# Patient Record
Sex: FEMALE | Race: WHITE | NOT HISPANIC OR LATINO | Employment: OTHER | URBAN - METROPOLITAN AREA
[De-identification: names, ages, dates, MRNs, and addresses within clinical notes are randomized per-mention and may not be internally consistent; named-entity substitution may affect disease eponyms.]

---

## 2024-10-11 ENCOUNTER — HOSPITAL ENCOUNTER (INPATIENT)
Facility: HOSPITAL | Age: 79
LOS: 5 days | Discharge: HOME | DRG: 640 | End: 2024-10-16
Attending: EMERGENCY MEDICINE | Admitting: INTERNAL MEDICINE
Payer: MEDICARE

## 2024-10-11 ENCOUNTER — APPOINTMENT (INPATIENT)
Dept: CT IMAGING | Facility: HOSPITAL | Age: 79
DRG: 640 | End: 2024-10-11
Payer: MEDICARE

## 2024-10-11 ENCOUNTER — APPOINTMENT (INPATIENT)
Dept: RADIOLOGY | Facility: HOSPITAL | Age: 79
DRG: 640 | End: 2024-10-11
Payer: MEDICARE

## 2024-10-11 ENCOUNTER — APPOINTMENT (EMERGENCY)
Dept: RADIOLOGY | Facility: HOSPITAL | Age: 79
DRG: 640 | End: 2024-10-11
Payer: MEDICARE

## 2024-10-11 ENCOUNTER — APPOINTMENT (EMERGENCY)
Dept: CT IMAGING | Facility: HOSPITAL | Age: 79
DRG: 640 | End: 2024-10-11
Payer: MEDICARE

## 2024-10-11 DIAGNOSIS — R42 DIZZINESS: ICD-10-CM

## 2024-10-11 DIAGNOSIS — R29.90 STROKE-LIKE SYMPTOMS: Primary | ICD-10-CM

## 2024-10-11 DIAGNOSIS — I10 HTN (HYPERTENSION): ICD-10-CM

## 2024-10-11 DIAGNOSIS — Z91.81 H/O FALL: ICD-10-CM

## 2024-10-11 DIAGNOSIS — D64.9 ANEMIA: ICD-10-CM

## 2024-10-11 DIAGNOSIS — E87.1 HYPONATREMIA: ICD-10-CM

## 2024-10-11 DIAGNOSIS — R41.82 ALTERED MENTAL STATUS: ICD-10-CM

## 2024-10-11 PROBLEM — E03.9 HYPOTHYROID: Status: ACTIVE | Noted: 2024-10-11

## 2024-10-11 PROBLEM — E78.5 HLD (HYPERLIPIDEMIA): Status: ACTIVE | Noted: 2024-10-11

## 2024-10-11 PROBLEM — N18.9 CKD (CHRONIC KIDNEY DISEASE): Status: ACTIVE | Noted: 2024-10-11

## 2024-10-11 PROBLEM — R05.9 COUGH: Status: ACTIVE | Noted: 2024-10-11

## 2024-10-11 LAB
AMMONIA PLAS-SCNC: 10 UMOL/L (ref 18–72)
AMPHETAMINES SERPL QL SCN: NEGATIVE
ANION GAP SERPL CALCULATED.3IONS-SCNC: 10 MMOL/L (ref 4–13)
ANION GAP SERPL CALCULATED.3IONS-SCNC: 10 MMOL/L (ref 4–13)
ANION GAP SERPL CALCULATED.3IONS-SCNC: 8 MMOL/L (ref 4–13)
ANION GAP SERPL CALCULATED.3IONS-SCNC: 9 MMOL/L (ref 4–13)
APAP SERPL-MCNC: <2 UG/ML (ref 10–20)
APTT PPP: 20 SECONDS (ref 23–34)
BARBITURATES UR QL: NEGATIVE
BENZODIAZ UR QL: NEGATIVE
BILIRUB UR QL STRIP: NEGATIVE
BNP SERPL-MCNC: 47 PG/ML (ref 0–100)
BUN SERPL-MCNC: 24 MG/DL (ref 5–25)
BUN SERPL-MCNC: 24 MG/DL (ref 5–25)
BUN SERPL-MCNC: 25 MG/DL (ref 5–25)
BUN SERPL-MCNC: 25 MG/DL (ref 5–25)
CALCIUM SERPL-MCNC: 8.9 MG/DL (ref 8.4–10.2)
CALCIUM SERPL-MCNC: 8.9 MG/DL (ref 8.4–10.2)
CALCIUM SERPL-MCNC: 9 MG/DL (ref 8.4–10.2)
CALCIUM SERPL-MCNC: 9.3 MG/DL (ref 8.4–10.2)
CARDIAC TROPONIN I PNL SERPL HS: 3 NG/L
CHLORIDE SERPL-SCNC: 93 MMOL/L (ref 96–108)
CHLORIDE SERPL-SCNC: 96 MMOL/L (ref 96–108)
CHLORIDE SERPL-SCNC: 96 MMOL/L (ref 96–108)
CHLORIDE SERPL-SCNC: 97 MMOL/L (ref 96–108)
CLARITY UR: CLEAR
CO2 SERPL-SCNC: 22 MMOL/L (ref 21–32)
CO2 SERPL-SCNC: 24 MMOL/L (ref 21–32)
CO2 SERPL-SCNC: 24 MMOL/L (ref 21–32)
CO2 SERPL-SCNC: 27 MMOL/L (ref 21–32)
COCAINE UR QL: NEGATIVE
COLOR UR: COLORLESS
CORTIS AM PEAK SERPL-MCNC: 35.8 UG/DL (ref 6.7–22.6)
CREAT SERPL-MCNC: 1.1 MG/DL (ref 0.6–1.3)
CREAT SERPL-MCNC: 1.23 MG/DL (ref 0.6–1.3)
CREAT SERPL-MCNC: 1.39 MG/DL (ref 0.6–1.3)
CREAT SERPL-MCNC: 1.49 MG/DL (ref 0.6–1.3)
ERYTHROCYTE [DISTWIDTH] IN BLOOD BY AUTOMATED COUNT: 12.9 % (ref 11.6–15.1)
ETHANOL SERPL-MCNC: <10 MG/DL
FENTANYL UR QL SCN: NEGATIVE
FLUAV RNA RESP QL NAA+PROBE: NEGATIVE
FLUBV RNA RESP QL NAA+PROBE: NEGATIVE
GFR SERPL CREATININE-BSD FRML MDRD: 32 ML/MIN/1.73SQ M
GFR SERPL CREATININE-BSD FRML MDRD: 35 ML/MIN/1.73SQ M
GFR SERPL CREATININE-BSD FRML MDRD: 41 ML/MIN/1.73SQ M
GFR SERPL CREATININE-BSD FRML MDRD: 47 ML/MIN/1.73SQ M
GLUCOSE SERPL-MCNC: 100 MG/DL (ref 65–140)
GLUCOSE SERPL-MCNC: 103 MG/DL (ref 65–140)
GLUCOSE SERPL-MCNC: 106 MG/DL (ref 65–140)
GLUCOSE SERPL-MCNC: 108 MG/DL (ref 65–140)
GLUCOSE SERPL-MCNC: 110 MG/DL (ref 65–140)
GLUCOSE UR STRIP-MCNC: NEGATIVE MG/DL
HCT VFR BLD AUTO: 30.2 % (ref 34.8–46.1)
HGB BLD-MCNC: 10.4 G/DL (ref 11.5–15.4)
HGB RETIC QN AUTO: 32.5 PG (ref 30–38.3)
HGB UR QL STRIP.AUTO: NEGATIVE
HYDROCODONE UR QL SCN: NEGATIVE
IMM RETICS NFR: 5.7 % (ref 0–14)
INR PPP: 0.93 (ref 0.85–1.19)
KETONES UR STRIP-MCNC: NEGATIVE MG/DL
LDH SERPL-CCNC: 156 U/L (ref 140–271)
LEUKOCYTE ESTERASE UR QL STRIP: NEGATIVE
MCH RBC QN AUTO: 28.2 PG (ref 26.8–34.3)
MCHC RBC AUTO-ENTMCNC: 34.4 G/DL (ref 31.4–37.4)
MCV RBC AUTO: 82 FL (ref 82–98)
METHADONE UR QL: NEGATIVE
NITRITE UR QL STRIP: NEGATIVE
OPIATES UR QL SCN: NEGATIVE
OSMOLALITY UR/SERPL-RTO: 286 MMOL/KG (ref 282–298)
OSMOLALITY UR/SERPL-RTO: 287 MMOL/KG (ref 282–298)
OSMOLALITY UR/SERPL-RTO: 287 MMOL/KG (ref 282–298)
OSMOLALITY UR: 420 MMOL/KG
OXYCODONE+OXYMORPHONE UR QL SCN: NEGATIVE
PCP UR QL: NEGATIVE
PH UR STRIP.AUTO: 8 [PH]
PLATELET # BLD AUTO: 321 THOUSANDS/UL (ref 149–390)
PMV BLD AUTO: 8.5 FL (ref 8.9–12.7)
POTASSIUM SERPL-SCNC: 4.3 MMOL/L (ref 3.5–5.3)
POTASSIUM SERPL-SCNC: 4.4 MMOL/L (ref 3.5–5.3)
POTASSIUM SERPL-SCNC: 4.5 MMOL/L (ref 3.5–5.3)
POTASSIUM SERPL-SCNC: 4.7 MMOL/L (ref 3.5–5.3)
PROCALCITONIN SERPL-MCNC: <0.05 NG/ML
PROT UR STRIP-MCNC: NEGATIVE MG/DL
PROTHROMBIN TIME: 13.1 SECONDS (ref 12.3–15)
RBC # BLD AUTO: 3.69 MILLION/UL (ref 3.81–5.12)
RETICS # AUTO: NORMAL 10*3/UL (ref 14097–95744)
RETICS # CALC: 1.62 % (ref 0.37–1.87)
RSV RNA RESP QL NAA+PROBE: NEGATIVE
SALICYLATES SERPL-MCNC: <5 MG/DL (ref 3–20)
SARS-COV-2 RNA RESP QL NAA+PROBE: NEGATIVE
SODIUM 24H UR-SCNC: 48 MOL/L
SODIUM SERPL-SCNC: 126 MMOL/L (ref 135–147)
SODIUM SERPL-SCNC: 129 MMOL/L (ref 135–147)
SODIUM SERPL-SCNC: 130 MMOL/L (ref 135–147)
SODIUM SERPL-SCNC: 131 MMOL/L (ref 135–147)
SP GR UR STRIP.AUTO: 1.03 (ref 1–1.03)
T4 FREE SERPL-MCNC: 0.94 NG/DL (ref 0.61–1.12)
THC UR QL: NEGATIVE
TSH SERPL DL<=0.05 MIU/L-ACNC: 5.38 UIU/ML (ref 0.45–4.5)
UROBILINOGEN UR STRIP-ACNC: <2 MG/DL
VIT B12 SERPL-MCNC: 209 PG/ML (ref 180–914)
WBC # BLD AUTO: 9.05 THOUSAND/UL (ref 4.31–10.16)

## 2024-10-11 PROCEDURE — 85046 RETICYTE/HGB CONCENTRATE: CPT | Performed by: STUDENT IN AN ORGANIZED HEALTH CARE EDUCATION/TRAINING PROGRAM

## 2024-10-11 PROCEDURE — 85730 THROMBOPLASTIN TIME PARTIAL: CPT

## 2024-10-11 PROCEDURE — 73030 X-RAY EXAM OF SHOULDER: CPT

## 2024-10-11 PROCEDURE — 84484 ASSAY OF TROPONIN QUANT: CPT

## 2024-10-11 PROCEDURE — 85610 PROTHROMBIN TIME: CPT

## 2024-10-11 PROCEDURE — 99285 EMERGENCY DEPT VISIT HI MDM: CPT | Performed by: EMERGENCY MEDICINE

## 2024-10-11 PROCEDURE — 85027 COMPLETE CBC AUTOMATED: CPT

## 2024-10-11 PROCEDURE — 99285 EMERGENCY DEPT VISIT HI MDM: CPT

## 2024-10-11 PROCEDURE — 74176 CT ABD & PELVIS W/O CONTRAST: CPT

## 2024-10-11 PROCEDURE — 71045 X-RAY EXAM CHEST 1 VIEW: CPT

## 2024-10-11 PROCEDURE — 83550 IRON BINDING TEST: CPT | Performed by: STUDENT IN AN ORGANIZED HEALTH CARE EDUCATION/TRAINING PROGRAM

## 2024-10-11 PROCEDURE — 83935 ASSAY OF URINE OSMOLALITY: CPT | Performed by: STUDENT IN AN ORGANIZED HEALTH CARE EDUCATION/TRAINING PROGRAM

## 2024-10-11 PROCEDURE — 96374 THER/PROPH/DIAG INJ IV PUSH: CPT

## 2024-10-11 PROCEDURE — 81003 URINALYSIS AUTO W/O SCOPE: CPT

## 2024-10-11 PROCEDURE — 71250 CT THORAX DX C-: CPT

## 2024-10-11 PROCEDURE — 83540 ASSAY OF IRON: CPT | Performed by: STUDENT IN AN ORGANIZED HEALTH CARE EDUCATION/TRAINING PROGRAM

## 2024-10-11 PROCEDURE — 84439 ASSAY OF FREE THYROXINE: CPT

## 2024-10-11 PROCEDURE — 84145 PROCALCITONIN (PCT): CPT | Performed by: STUDENT IN AN ORGANIZED HEALTH CARE EDUCATION/TRAINING PROGRAM

## 2024-10-11 PROCEDURE — 87040 BLOOD CULTURE FOR BACTERIA: CPT | Performed by: STUDENT IN AN ORGANIZED HEALTH CARE EDUCATION/TRAINING PROGRAM

## 2024-10-11 PROCEDURE — 80048 BASIC METABOLIC PNL TOTAL CA: CPT

## 2024-10-11 PROCEDURE — 83615 LACTATE (LD) (LDH) ENZYME: CPT | Performed by: STUDENT IN AN ORGANIZED HEALTH CARE EDUCATION/TRAINING PROGRAM

## 2024-10-11 PROCEDURE — 80143 DRUG ASSAY ACETAMINOPHEN: CPT

## 2024-10-11 PROCEDURE — 82140 ASSAY OF AMMONIA: CPT

## 2024-10-11 PROCEDURE — 83918 ORGANIC ACIDS TOTAL QUANT: CPT | Performed by: STUDENT IN AN ORGANIZED HEALTH CARE EDUCATION/TRAINING PROGRAM

## 2024-10-11 PROCEDURE — 84300 ASSAY OF URINE SODIUM: CPT | Performed by: STUDENT IN AN ORGANIZED HEALTH CARE EDUCATION/TRAINING PROGRAM

## 2024-10-11 PROCEDURE — 0241U HB NFCT DS VIR RESP RNA 4 TRGT: CPT | Performed by: STUDENT IN AN ORGANIZED HEALTH CARE EDUCATION/TRAINING PROGRAM

## 2024-10-11 PROCEDURE — 80307 DRUG TEST PRSMV CHEM ANLYZR: CPT

## 2024-10-11 PROCEDURE — 80179 DRUG ASSAY SALICYLATE: CPT

## 2024-10-11 PROCEDURE — 82607 VITAMIN B-12: CPT

## 2024-10-11 PROCEDURE — 83880 ASSAY OF NATRIURETIC PEPTIDE: CPT | Performed by: STUDENT IN AN ORGANIZED HEALTH CARE EDUCATION/TRAINING PROGRAM

## 2024-10-11 PROCEDURE — 36415 COLL VENOUS BLD VENIPUNCTURE: CPT

## 2024-10-11 PROCEDURE — 82077 ASSAY SPEC XCP UR&BREATH IA: CPT

## 2024-10-11 PROCEDURE — 83930 ASSAY OF BLOOD OSMOLALITY: CPT | Performed by: STUDENT IN AN ORGANIZED HEALTH CARE EDUCATION/TRAINING PROGRAM

## 2024-10-11 PROCEDURE — 82533 TOTAL CORTISOL: CPT | Performed by: STUDENT IN AN ORGANIZED HEALTH CARE EDUCATION/TRAINING PROGRAM

## 2024-10-11 PROCEDURE — 70498 CT ANGIOGRAPHY NECK: CPT

## 2024-10-11 PROCEDURE — 82728 ASSAY OF FERRITIN: CPT | Performed by: STUDENT IN AN ORGANIZED HEALTH CARE EDUCATION/TRAINING PROGRAM

## 2024-10-11 PROCEDURE — 99291 CRITICAL CARE FIRST HOUR: CPT | Performed by: PSYCHIATRY & NEUROLOGY

## 2024-10-11 PROCEDURE — 84443 ASSAY THYROID STIM HORMONE: CPT

## 2024-10-11 PROCEDURE — 80048 BASIC METABOLIC PNL TOTAL CA: CPT | Performed by: STUDENT IN AN ORGANIZED HEALTH CARE EDUCATION/TRAINING PROGRAM

## 2024-10-11 PROCEDURE — 82948 REAGENT STRIP/BLOOD GLUCOSE: CPT

## 2024-10-11 PROCEDURE — 82746 ASSAY OF FOLIC ACID SERUM: CPT | Performed by: STUDENT IN AN ORGANIZED HEALTH CARE EDUCATION/TRAINING PROGRAM

## 2024-10-11 PROCEDURE — 70496 CT ANGIOGRAPHY HEAD: CPT

## 2024-10-11 PROCEDURE — 99222 1ST HOSP IP/OBS MODERATE 55: CPT | Performed by: INTERNAL MEDICINE

## 2024-10-11 RX ORDER — ONDANSETRON 2 MG/ML
4 INJECTION INTRAMUSCULAR; INTRAVENOUS ONCE
Status: COMPLETED | OUTPATIENT
Start: 2024-10-11 | End: 2024-10-11

## 2024-10-11 RX ORDER — ASPIRIN 81 MG/1
81 TABLET, CHEWABLE ORAL DAILY
Status: DISCONTINUED | OUTPATIENT
Start: 2024-10-12 | End: 2024-10-14

## 2024-10-11 RX ORDER — LEVETIRACETAM 500 MG/5ML
1000 INJECTION, SOLUTION, CONCENTRATE INTRAVENOUS ONCE
Status: CANCELLED | OUTPATIENT
Start: 2024-10-11

## 2024-10-11 RX ORDER — DIPHENHYDRAMINE HYDROCHLORIDE 50 MG/ML
50 INJECTION INTRAMUSCULAR; INTRAVENOUS EVERY 6 HOURS PRN
Status: DISCONTINUED | OUTPATIENT
Start: 2024-10-11 | End: 2024-10-16 | Stop reason: HOSPADM

## 2024-10-11 RX ORDER — ATENOLOL 25 MG/1
25 TABLET ORAL DAILY
Status: DISCONTINUED | OUTPATIENT
Start: 2024-10-12 | End: 2024-10-15

## 2024-10-11 RX ORDER — LORAZEPAM 2 MG/ML
0.5 INJECTION INTRAMUSCULAR ONCE
Status: COMPLETED | OUTPATIENT
Start: 2024-10-11 | End: 2024-10-11

## 2024-10-11 RX ORDER — ENOXAPARIN SODIUM 100 MG/ML
30 INJECTION SUBCUTANEOUS DAILY
Status: DISCONTINUED | OUTPATIENT
Start: 2024-10-12 | End: 2024-10-13

## 2024-10-11 RX ORDER — ENOXAPARIN SODIUM 100 MG/ML
40 INJECTION SUBCUTANEOUS DAILY
Status: DISCONTINUED | OUTPATIENT
Start: 2024-10-11 | End: 2024-10-11

## 2024-10-11 RX ORDER — ACETAMINOPHEN 650 MG/1
650 SUPPOSITORY RECTAL EVERY 6 HOURS PRN
Status: DISCONTINUED | OUTPATIENT
Start: 2024-10-11 | End: 2024-10-16 | Stop reason: HOSPADM

## 2024-10-11 RX ORDER — ASPIRIN 325 MG
325 TABLET ORAL ONCE
Status: DISCONTINUED | OUTPATIENT
Start: 2024-10-11 | End: 2024-10-14

## 2024-10-11 RX ORDER — HYDRALAZINE HYDROCHLORIDE 20 MG/ML
5 INJECTION INTRAMUSCULAR; INTRAVENOUS EVERY 6 HOURS PRN
Status: DISCONTINUED | OUTPATIENT
Start: 2024-10-11 | End: 2024-10-14

## 2024-10-11 RX ORDER — ATENOLOL 25 MG/1
25 TABLET ORAL DAILY
Status: ON HOLD | COMMUNITY
End: 2024-10-16

## 2024-10-11 RX ORDER — CYANOCOBALAMIN 1000 UG/ML
1000 INJECTION, SOLUTION INTRAMUSCULAR; SUBCUTANEOUS DAILY
Status: DISCONTINUED | OUTPATIENT
Start: 2024-10-11 | End: 2024-10-12

## 2024-10-11 RX ORDER — CLOPIDOGREL BISULFATE 75 MG/1
300 TABLET ORAL ONCE
Status: DISCONTINUED | OUTPATIENT
Start: 2024-10-11 | End: 2024-10-14

## 2024-10-11 RX ORDER — CLOPIDOGREL BISULFATE 75 MG/1
75 TABLET ORAL DAILY
Status: DISCONTINUED | OUTPATIENT
Start: 2024-10-12 | End: 2024-10-12

## 2024-10-11 RX ADMIN — SODIUM CHLORIDE 500 ML: 0.9 INJECTION, SOLUTION INTRAVENOUS at 14:16

## 2024-10-11 RX ADMIN — IOHEXOL 85 ML: 350 INJECTION, SOLUTION INTRAVENOUS at 12:26

## 2024-10-11 RX ADMIN — ONDANSETRON 4 MG: 2 INJECTION INTRAMUSCULAR; INTRAVENOUS at 14:13

## 2024-10-11 RX ADMIN — LORAZEPAM 0.5 MG: 2 INJECTION INTRAMUSCULAR; INTRAVENOUS at 15:18

## 2024-10-11 RX ADMIN — CEFTRIAXONE 1000 MG: 2 INJECTION, POWDER, FOR SOLUTION INTRAMUSCULAR; INTRAVENOUS at 20:22

## 2024-10-11 RX ADMIN — CYANOCOBALAMIN 1000 MCG: 1000 INJECTION, SOLUTION INTRAMUSCULAR; SUBCUTANEOUS at 20:22

## 2024-10-11 RX ADMIN — DIPHENHYDRAMINE HYDROCHLORIDE 50 MG: 50 INJECTION, SOLUTION INTRAMUSCULAR; INTRAVENOUS at 18:40

## 2024-10-11 NOTE — ASSESSMENT & PLAN NOTE
Pt's son reports that his mom was coughing yesterday  He is unable to clarify if cough was productive,he denies fever  WBC wnl, CXR is unremarkable  Follow CT CAP  Obtain Covid  Follow proCal  We'll hold off on oral symptomatic meds due to AMS for now

## 2024-10-11 NOTE — ASSESSMENT & PLAN NOTE
Lab Results   Component Value Date    EGFR 47 10/11/2024    CREATININE 1.10 10/11/2024   Avoid hypotension and nephrotoxic medications

## 2024-10-11 NOTE — ASSESSMENT & PLAN NOTE
"Pt's son reported fall about 2.5 weeks ago on her \"knees and hands\", managed with tylenol  Pt was complaining of R shoulder and right lateral chest wall pain (possibly ribs)  Xray chest was unremarkable  Obtain Right shoulder Xray  Tylenol for pain prn  Voltaren gel and lidocaine patch prn    "

## 2024-10-11 NOTE — ASSESSMENT & PLAN NOTE
POA with confusion, delirium starting this am, last well known yesterday as per son  Denies any sx of URI, c/o of dysuria, chest pain, fever, chills or any other complains  Pt's so admits mild cough yesterday with no fever  On evaluation pt is disoriented, delirious, restless and agitated, unable to answer questions or to provide history. Due to this comprehensive neuro exam wasn't completed. Pt seems to move all 4 extremities however there is suspicion for right sided neglect  Pt was hypertensive on exam to  which improved to 160  Blood work was significant for TSH 5.3, normocytic anemia with Hgb 10.4, Na+ 126, high specific gravity in UA 1.031  Normal coma panel, normal ammonia  CTH and CTA h&n showed no acute bleeding or significant occlusion  Pt received  cc bolus in ED, load dose of asa 325 and plavix 300, ativan 0.5 for agitation    Ddx: Stroke vs seizure vs hyponatremia (previous similar presentation in 2021, recent fall with pain in shoulder for the last 2 weeks) vs metabolic etiology (low suspicion for infection, possible undiagnosed malignancy) vs toxic (less likely with normal coma panel)    Plan:  Admit for stroke pathway  Management of hyponatremia as above  Allow permissive hypertension for the next 24 hours  Monitor vitals and fever curve  Follow daily BMP, replete electrolytes as needed  Monitor patient on telemetry for 24 hours  Keep n.p.o. in settings of altered mental status  Speech evaluation when patient is more alert  Follow lipid panel, A1c, vit B12  Continue with aspirin 81 mg daily starting tomorrow if patient is more alert  Continue with Plavix 75 mg daily starting tomorrow if patient is more alert  Follow MRI brain  Follow ECHO  PT/OT eval would be appreciated when pt is more alert  Fall precautions  Follow CT CAP w/o contrast

## 2024-10-11 NOTE — ASSESSMENT & PLAN NOTE
Assessment:  Patient is an 79-year-old female that presented to [facility] as a stroke alert in setting of altered mental status.  Patient has a past medical history of hypertension on atenolol.  Patient's last known well was reported to be 10/11/2024 at 0300 where before going to bed the patient had dinner in which she fell with her son.  The patient was found in a confused state on 10/11/2024 at 0830.  Blood pressure 160/62, blood glucose 160, no AC AP at baseline, NIH 14, noncontrast CT head demonstrated no acute intracranial abnormality, CTA head and neck demonstrated no significant intracranial stenosis/LVO or aneurysm.  Patient not a tenecteplase candidate due to being out of time window, not a thrombectomy candidate due to no LVO target.  Per discussion with patient's son, Manuel, at bedside patient had a similar episode in 2021 with the patient was hospitalized at [facility] and was found to be hyponatremic.  Patient was reported to have had other workup completed which was unremarkable and after a few days patient was discharged home.  Was reported to have had a fall 2 weeks ago with unknown head strike.  Patient was reported to have had a cough with questionable bloody stools.    Initial examination demonstrated the patient in significant acute distress, significant confusion, patient had inability to reorient, inability to follow commands, thus limiting ROS, physical examination, but update as of 10/12 AM with pt's son at bedside - AAOx4 with improved mentation though slightly slowed response but also noting [place], no focal lateralizing deficits. Son reports pt very close to baseline now. Na level also improved from 126 to 131 as of this AM.    TTE, 10/12/24: EF 65%, normal systolic fxn, G1DD, mildly dilated LA, no major valvular dysfunction, IVC normal size.  Telemetry as of 10/12AM - No cardiac arrhythmia.    Lab Results   Component Value Date    CHOLESTEROL 168 10/12/2024     TRIG 119 10/12/2024    HDL 43 (L) 10/12/2024    LDLCALC 101 (H) 10/12/2024     Lab Results   Component Value Date    HGBA1C 5.6 10/12/2024     Lab Results   Component Value Date    [id number] 5.383 (H) 10/11/2024    FREET4 0.94 10/11/2024     Impression: Low suspicion for CVA. Suspect TME in setting of hypoNa and suspected PNA.    Plan:  Continue stroke pathway, pending MRI brain  If MRI brain negative, OK to d/c stroke pathway  Pt's son OK with pt receiving ASA but declined Plavix. Will cont ASA 81mg daily for now and d/c Plavix. OK to d/c ASA if MRI brain negative for stroke.  Pt's son would like to hold off on Atorvastatin at this time - reasonable.  Normotensive goal.  Normoglycemia (glucose <180), normothermia.  Continue to monitor on telemetry.  Frequent neuro checks per protocol  PT/OT/ST eval appreciated  Does occasionally feel like fluid is getting stuck in her throat but no swallowing issues per speech. Will defer to primary team for any additional eval.  If MRI brain negative, neurology will sign off with no indication for outpatient neurology follow up. If MRI brain is concerning, neurology team will be back in touch with final recommendations.  We will follow peripherally. Please reach out with any questions or concerns.

## 2024-10-11 NOTE — ASSESSMENT & PLAN NOTE
POA with HGB 10.4, normocytic, RDW within normal range  Reported black stools by pt's son  H/o of CKD 3  Follow daily CBC, transfuse if HGB <7  Obtain FOBT  Obtain iron panel  Obtain LDH  Obtain reticulocytes coung  Obtain CEA  Recommend GI follow up in o/p settings

## 2024-10-11 NOTE — H&P
H&P - Hospitalist   Name: Ashlee Bermeo 79 y.o. female I MRN: [id number]  Unit/Bed#: ED-33 I Date of Admission: 10/11/2024   Date of Service: 10/11/2024 I Hospital Day: 0     Assessment & Plan  AMS (altered mental status)  POA with confusion, delirium starting this am, last well known yesterday as per son  Denies any sx of URI, c/o of dysuria, chest pain, fever, chills or any other complains  Pt's so admits mild cough yesterday with no fever  On evaluation pt is disoriented, delirious, restless and agitated, unable to answer questions or to provide history. Due to this comprehensive neuro exam wasn't completed. Pt seems to move all 4 extremities however there is suspicion for right sided neglect  Pt was hypertensive on exam to  which improved to 160  Blood work was significant for TSH 5.3, normocytic anemia with Hgb 10.4, Na+ 126, high specific gravity in UA 1.031  Normal coma panel, normal ammonia  CTH and CTA h&n showed no acute bleeding or significant occlusion  Pt received  cc bolus in ED, load dose of asa 325 and plavix 300, ativan 0.5 for agitation    Ddx: Stroke vs seizure vs hyponatremia (previous similar presentation in 2021, recent fall with pain in shoulder for the last 2 weeks) vs metabolic etiology (low suspicion for infection, possible undiagnosed malignancy) vs toxic (less likely with normal coma panel)    Plan:  Admit for stroke pathway  Management of hyponatremia as above  Allow permissive hypertension for the next 24 hours  Monitor vitals and fever curve  Follow daily BMP, replete electrolytes as needed  Monitor patient on telemetry for 24 hours  Keep n.p.o. in settings of altered mental status  Speech evaluation when patient is more alert  Follow lipid panel, A1c, vit B12  Continue with aspirin 81 mg daily starting tomorrow if patient is more alert  Continue with Plavix 75 mg daily starting tomorrow if patient is more alert  Follow MRI brain  Follow ECHO  PT/OT eval would be  appreciated when pt is more alert  Fall precautions  Follow CT CAP w/o contrast    Hyponatremia  POA with confusion and sodium 126  Similar presentation in 2021 as per pt's son, mentation improved at that time with sodium normalization  H/o of fall about 2.5 weeks ago, pt had mild pain since then and was taking tylenol, denies NSAIDs  Pt's son denies poor oral intake, nausea, vomiting or diarrhea  Pt look on the dry side on physical exam  Pt received  cc bolus in ED, Na+ improved to 129, we'll hold of on further fluids for now  Na+ goal 134 by tomorrow afternoon  Ddx: SIADH in settings of recent fall and pain vs undiagnosed malignancy (positive coluguard,sister has stomach ca at age of 40) vs tea/toast syndrome vs hypocortisolism vs hypothyroidism is mild less likely contributing    Plan:  Follow BMP Q 4,   Follow urinary sodium  Follow serum osm  Follow urinary osm  Follow am cortisol  Proceed with CT CAP    Anemia  POA with HGB 10.4, normocytic, RDW within normal range  Reported black stools by pt's son  H/o of CKD 3  Follow daily CBC, transfuse if HGB <7  Obtain FOBT  Obtain iron panel  Obtain LDH  Obtain reticulocytes coung  Obtain CEA  Recommend GI follow up in o/p settings    HTN (hypertension)  History confirmed with PCP office  PTA meds atenolol 25 mg daily  Pt was hypertensive on admission with , improved without intervention to , possibly agitation contributing, allow permissive hypertension for the next 24 h  Restart atenolol tomorrow afternoon if stroke rulled out and pt is able to take po meds  HLD (hyperlipidemia)  As per PCP office pt is not on statin  Follow lipid panel  Hypothyroid  Not taking levothyroxine as per PCP office  TSH is slightly elevated to 5.3  Follow T4  CKD (chronic kidney disease)  Lab Results   Component Value Date    EGFR 47 10/11/2024    CREATININE 1.10 10/11/2024   Avoid hypotension and nephrotoxic medications  H/O fall  Pt's son reported fall about 2.5 weeks  "ago on her \"knees and hands\", managed with tylenol  Pt was complaining of R shoulder and right lateral chest wall pain (possibly ribs)  Xray chest was unremarkable  Obtain Right shoulder Xray  Tylenol for pain prn  Voltaren gel and lidocaine patch prn    Cough  Pt's son reports that his mom was coughing yesterday  He is unable to clarify if cough was productive,he denies fever  WBC wnl, CXR is unremarkable  Follow CT CAP  Obtain Covid  Follow proCal  We'll hold off on oral symptomatic meds due to AMS for now        VTE Pharmacologic Prophylaxis: VTE Score: 8 High Risk (Score >/= 5) - Pharmacological DVT Prophylaxis Ordered: enoxaparin (Lovenox). Sequential Compression Devices Ordered.  Code Status: Level 1 - Full Code   Discussion with family: Updated  (son) at bedside.    Anticipated Length of Stay: Patient will be admitted on an inpatient basis with an anticipated length of stay of greater than 2 midnights secondary to AMS, hyponatremia.    History of Present Illness   Chief Complaint: AMS    Ashlee Bermeo is a 79 y.o. female with a PMH of hypertension, hyperlipidemia, hypothyroidism, osteoporosis, CKD 3, aortic dilation, who presents with stroke like symptoms: confusion, delirium and questionable right olvin neglect upon waking up today. LWK was yesterday around 8:30 PM. Pt son is at the bedside providing history. Pt is living in [place] and following with PC there. As per pt's son pt is taking only tylenol as needed. Pt son states that his mom is healthy and didn't expressed any complains recently, denies fever, URI symptoms, urinary symptoms. Pt's son mentioned that about 2.5 weeks ago pt fell and hurt her Right shoulder, right chest wall and right knee. Pt was taking tylenol at home for pain. Pt's son denies poor appetite, nausea, vomiting, diarrhea. However, he admits that his mom bowel movement changed recently and that she was frequently constipated and had a black stool. Pt had recent " visit to ED with abdominal pain with negative work up, pt was dc with colace and famotidine. Pt's son reports that his mom had a bowel movement this am.    OFF NOTE: Pt's PCP is Heidy Seay, [phone]. I called PCP and confirmed pt's PMH and medications. Pt was seen by PCP about 3 years ago and had a tele visit about 1 year ago.  Positive cologuard but negative colonography.    Summary: pt was hypertensive on admission, w/u is remarkable for Hgb 10.4, normocytic, TSH of 5.383, and a sodium of 126, UA specific gravity 1.031.CTH, CTA h&n showed no obvious bleeding or occlusion. Stroke pathway.  Plavix and aspirin loading dose were given.  After 500 c NS bolus sodium improved to 129. No further fluids for now. Pt will be admitted to inpatient service on telemetry for stroke pathway and hyponatremia management.    Review of Systems   Constitutional:  Negative for chills and fever.   HENT:  Negative for ear pain and sore throat.    Eyes:  Negative for pain and visual disturbance.   Respiratory:  Positive for cough. Negative for shortness of breath.    Cardiovascular:  Negative for chest pain and palpitations.   Gastrointestinal:  Positive for constipation. Negative for abdominal pain and vomiting.   Genitourinary:  Negative for dysuria and hematuria.   Musculoskeletal:  Negative for arthralgias and back pain.   Skin:  Negative for color change and rash.   Neurological:  Negative for seizures and syncope.   Psychiatric/Behavioral:  Positive for agitation and confusion. The patient is nervous/anxious.    All other systems reviewed and are negative.      Historical Information   No past medical history on file.  No past surgical history on file.  Social History     Tobacco Use    Smoking status: Not on file    Smokeless tobacco: Not on file   Substance and Sexual Activity    Alcohol use: Not on file    Drug use: Not on file    Sexual activity: Not on file     No existing history information found.  No existing  history information found.    Social History:  Marital Status:    Patient Pre-hospital Living Situation: pt is currently living in hotel  Patient Pre-hospital Level of Mobility:  pt's son reports that pt is independent at baseline  Patient Pre-hospital Diet Restrictions: not following specific diet    Meds/Allergies   (Not in a hospital admission)    Allergies   Allergen Reactions    Erythromycin Other (See Comments)    Prednisone Other (See Comments)     ~Blew up~; She thinks this might have been when she had her back injury (herniated disk)...she says it was not an immediate reaction like an allergy - she just got 'puffy' over the weeks of taking it. This sounds more like a cushingoid reaction than an allergy...    Alendronate Other (See Comments)     ~can't swallow~;    Aspirin GI Intolerance and Other (See Comments)    Diphth-Acell Pertussis-Tetanus Other (See Comments)    Iodinated Contrast Media Other (See Comments)     swelling;    Lisinopril Other (See Comments)     tongue tingling; throat tightness; cheek swelling; sx occurred 24 hours after first dose of lisinopril    Morphine Other (See Comments)     swelling;    Nifedipine Other (See Comments)     ankle edema;    Other GI Intolerance and Other (See Comments)     including biaxin (lip numbness) and azithromycin    Oxycodone Other (See Comments) and Vomiting    Penicillins Other (See Comments)     dizziness/trouble swallowing;    Simvastatin Other (See Comments)     ~can't walk~;    Sulfa Antibiotics Other (See Comments)    Sulfamethoxazole-Trimethoprim Other (See Comments)    Tetracycline Other (See Comments)       Objective :  Temp:  [97.8 °F (36.6 °C)] 97.8 °F (36.6 °C)  HR:  [67-91] 91  BP: (122-198)/(65-99) 166/65  Resp:  [18] 18  SpO2:  [94 %-100 %] 94 %  O2 Device: None (Room air)    Physical Exam  Vitals and nursing note reviewed.   Constitutional:       General: She is in acute distress.      Appearance: She is well-developed.   HENT:       "Head: Normocephalic and atraumatic.   Eyes:      Conjunctiva/sclera: Conjunctivae normal.   Cardiovascular:      Rate and Rhythm: Normal rate and regular rhythm.      Heart sounds: No murmur heard.  Pulmonary:      Effort: Pulmonary effort is normal. No respiratory distress.      Breath sounds: Normal breath sounds.   Abdominal:      Palpations: Abdomen is soft.      Tenderness: There is no abdominal tenderness.   Musculoskeletal:         General: No swelling.      Cervical back: Neck supple.   Skin:     General: Skin is warm and dry.      Capillary Refill: Capillary refill takes less than 2 seconds.   Neurological:      Mental Status: She is alert. She is disoriented.      Comments: Unable to complete comprehensive neurologic exam in settings of agitation, confusion and delirium. Possible right sided neglect as pt is not following her son when he is standing on the right side.   Psychiatric:         Mood and Affect: Mood normal.         Lines/Drains:            Lab Results: I have reviewed the following results:  Results from last 7 days   Lab Units 10/11/24  1238   WBC Thousand/uL 9.05   HEMOGLOBIN g/dL 10.4*   HEMATOCRIT % 30.2*   PLATELETS Thousands/uL 321     Results from last 7 days   Lab Units 10/11/24  1238   SODIUM mmol/L 126*   POTASSIUM mmol/L 4.4   CHLORIDE mmol/L 93*   CO2 mmol/L 24   BUN mg/dL 25   CREATININE mg/dL 1.10   ANION GAP mmol/L 9   CALCIUM mg/dL 9.0   GLUCOSE RANDOM mg/dL 110     Results from last 7 days   Lab Units 10/11/24  1238   INR  0.93     Results from last 7 days   Lab Units 10/11/24  1215   POC GLUCOSE mg/dl 103     No results found for: \"HGBA1C\"        Imaging reports were reviewed: CTH, CTA H&N, CXR. CT CAP is pending.  Other Study Results Review: EKG was reviewed.     Administrative Statements   I have spent a total time of 60 minutes in caring for this patient on the day of the visit/encounter including Diagnostic results, Prognosis, Risks and benefits of tx options, " Instructions for management, Patient and family education, Importance of tx compliance, Risk factor reductions, Impressions, Counseling / Coordination of care, Documenting in the medical record, Reviewing / ordering tests, medicine, procedures  , and Obtaining or reviewing history  .    ** Please Note: This note has been constructed using a voice recognition system. **

## 2024-10-11 NOTE — ED PROVIDER NOTES
Time reflects when diagnosis was documented in both MDM as applicable and the Disposition within this note       Time User Action Codes Description Comment    10/11/2024 12:15 PM Apoorva Alfredo [R29.90] Stroke-like symptoms     10/11/2024  2:44 PM Apoorva Alfredo Add [R41.82] Altered mental status     10/11/2024  2:44 PM Apoorva Alfredo Add [E87.1] Hyponatremia     10/11/2024  2:44 PM Apoorva Alfredo [D64.9] Anemia           ED Disposition       ED Disposition   Admit    Condition   Stable    Date/Time   Fri Oct 11, 2024  2:45 PM    Comment   Case was discussed with HO and the patient's admission status was agreed to be Admission Status: inpatient status to the service of Dr. Levy .               Assessment & Plan       Medical Decision Making  Ashlee is an 79-year-old female with no known past medical history presents the emergency department with strokelike symptoms.    DDx includes but is not limited to: electrolyte abn, ICH, intracranial occlusion, thyroid pathology    See ED course for MDM    Results shared with patient. Patient admitted to [facility] on the stroke pathway.       Amount and/or Complexity of Data Reviewed  Labs: ordered.  Radiology: ordered.    Risk  OTC drugs.  Prescription drug management.        ED Course as of 10/11/24 1450   Fri Oct 11, 2024   1222 TSH and B12 ordered at the request of Neuro they will follow up on results   1224 Neuro at bedside   1413 CBC and Platelet(!)  Patient is mildly anemic, not requiring correction at this time.       Medications   aspirin tablet 325 mg (325 mg Oral Not Given 10/11/24 1334)   clopidogrel (PLAVIX) tablet 300 mg (300 mg Oral Not Given 10/11/24 1334)   sodium chloride 0.9 % bolus 500 mL (500 mL Intravenous New Bag 10/11/24 1416)   iohexol (OMNIPAQUE) 350 MG/ML injection (SINGLE-DOSE) 85 mL (85 mL Intravenous Given 10/11/24 1226)   ondansetron (ZOFRAN) injection 4 mg (4 mg Intravenous Given 10/11/24 1413)       ED Risk Strat Scores                                                History of Present Illness       Chief Complaint   Patient presents with    STROKE Alert       No past medical history on file.   No past surgical history on file.   No family history on file.       No existing history information found.   No existing history information found.   I have reviewed and agree with the history as documented.     Ashlee is an 79-year-old female with no known past medical history presents the emergency department with strokelike symptoms.  Patient is presenting via EMS.  They report that the history they were able to obtain from the son is limited, but they report that the patient was normal when she went to bed last night, and when she woke up this morning was acutely confused.  Unclear exactly what time patient woke up.  Patient is not following commands, is conscious and forming sentences, but seems to attentionally have receptive aphasia.  On my initial meeting of the patient she may also have right hemineglect.        Review of Systems   Unable to perform ROS: Mental status change           Objective       ED Triage Vitals [10/11/24 1215]   Temperature Pulse Blood Pressure Respirations SpO2 Patient Position - Orthostatic VS   97.8 °F (36.6 °C) 67 132/99 18 99 % Lying      Temp Source Heart Rate Source BP Location FiO2 (%) Pain Score    Oral Monitor -- -- --      Vitals      Date and Time Temp Pulse SpO2 Resp BP Pain Score FACES Pain Rating User   10/11/24 1415 -- 86 98 % 18 198/80 -- -- AD   10/11/24 1345 -- 86 100 % 18 144/89 -- -- AD   10/11/24 1315 -- 79 99 % 18 143/92 -- -- AD   10/11/24 1245 -- 84 100 % 18 122/76 -- -- AD   10/11/24 1230 -- 90 99 % 18 133/76 -- -- AD   10/11/24 1215 97.8 °F (36.6 °C) 67 99 % 18 132/99 -- -- AD            Physical Exam  Vitals and nursing note reviewed.   Constitutional:       Appearance: Normal appearance.   HENT:      Head: Normocephalic and atraumatic.      Right Ear: External ear normal.      Left Ear:  External ear normal.      Nose: Nose normal.      Mouth/Throat:      Mouth: Mucous membranes are moist.      Pharynx: Oropharynx is clear.   Eyes:      Extraocular Movements: Extraocular movements intact.      Conjunctiva/sclera: Conjunctivae normal.   Cardiovascular:      Rate and Rhythm: Normal rate and regular rhythm.      Heart sounds: Normal heart sounds.   Pulmonary:      Effort: Pulmonary effort is normal.      Breath sounds: Normal breath sounds.   Abdominal:      General: There is no distension.      Palpations: Abdomen is soft.      Tenderness: There is no guarding.   Musculoskeletal:         General: Normal range of motion.      Cervical back: Normal range of motion and neck supple.   Skin:     General: Skin is warm and dry.   Neurological:      Mental Status: She is alert.      Comments: Patient appears to be neglecting the right side, if pain is caused on the right side the patient will turn her head past midline to say ow, but does not acknowledge anyone on that side.  Patient does not follow any commands.  Will respond to pain in all 4 extremities, unable to assess other sensations.  When changing the patient into a gown she operated and attempted to assist in taking her pants off, but still did not follow any specific commands.  No obvious evidence of facial droop/cranial nerve abnormality.         Results Reviewed       Procedure Component Value Units Date/Time    Rapid drug screen, urine [[id number]]  (Normal) Collected: 10/11/24 1349    Lab Status: Final result Specimen: Urine, Catheter Updated: 10/11/24 1420     Amph/Meth UR Negative     Barbiturate Ur Negative     Benzodiazepine Urine Negative     Cocaine Urine Negative     Methadone Urine Negative     Opiate Urine Negative     PCP Ur Negative     THC Urine Negative     Oxycodone Urine Negative     Fentanyl Urine Negative     HYDROCODONE URINE Negative    Narrative:      FOR MEDICAL PURPOSES ONLY.   IF CONFIRMATION NEEDED PLEASE CONTACT THE LAB  WITHIN 5 DAYS.    Drug Screen Cutoff Levels:  AMPHETAMINE/METHAMPHETAMINES  1000 ng/mL  BARBITURATES     200 ng/mL  BENZODIAZEPINES     200 ng/mL  COCAINE      300 ng/mL  METHADONE      300 ng/mL  OPIATES      300 ng/mL  PHENCYCLIDINE     25 ng/mL  THC       50 ng/mL  OXYCODONE      100 ng/mL  FENTANYL      5 ng/mL  HYDROCODONE     300 ng/mL    UA w Reflex to Microscopic w Reflex to Culture [[id number]]  (Abnormal) Collected: 10/11/24 1349    Lab Status: Final result Specimen: Urine, Straight Cath Updated: 10/11/24 1403     Color, UA Colorless     Clarity, UA Clear     Specific Gravity, UA 1.031     pH, UA 8.0     Leukocytes, UA Negative     Nitrite, UA Negative     Protein, UA Negative mg/dl      Glucose, UA Negative mg/dl      Ketones, UA Negative mg/dl      Urobilinogen, UA <2.0 mg/dl      Bilirubin, UA Negative     Occult Blood, UA Negative    Salicylate level [[id number]]  (Normal) Collected: 10/11/24 1238    Lab Status: Final result Specimen: Blood from Arm, Right Updated: 10/11/24 1326     Salicylate Lvl <5 mg/dL     Acetaminophen level-If concentration is detectable, please discuss with medical  on call. [[id number]]  (Abnormal) Collected: 10/11/24 1238    Lab Status: Final result Specimen: Blood from Arm, Right Updated: 10/11/24 1326     Acetaminophen Level <2 ug/mL     TSH, 3rd generation with Free T4 reflex [[id number]]  (Abnormal) Collected: 10/11/24 1238    Lab Status: Final result Specimen: Blood from Arm, Right Updated: 10/11/24 1321     TSH 3RD GENERATON 5.383 uIU/mL     T4, free [[id number]] Collected: 10/11/24 1238    Lab Status: In process Specimen: Blood from Arm, Right Updated: 10/11/24 1321    HS Troponin 0hr (reflex protocol) [[id number]]  (Normal) Collected: 10/11/24 1238    Lab Status: Final result Specimen: Blood from Arm, Right Updated: 10/11/24 1311     hs TnI 0hr 3 ng/L     Protime-INR [[id number]]  (Normal) Collected: 10/11/24 1238    Lab Status: Final result Specimen: Blood from  Arm, Right Updated: 10/11/24 1308     Protime 13.1 seconds      INR 0.93    Narrative:      INR Therapeutic Range    Indication                                             INR Range      Atrial Fibrillation                                               2.0-3.0  Hypercoagulable State                                    2.0.2.3  Left Ventricular Asist Device                            2.0-3.0  Mechanical Heart Valve                                  -    Aortic(with afib, MI, embolism, HF, LA enlargement,    and/or coagulopathy)                                     2.0-3.0 (2.5-3.5)     Mitral                                                             2.5-3.5  Prosthetic/Bioprosthetic Heart Valve               2.0-3.0  Venous thromboembolism (VTE: VT, PE        2.0-3.0    APTT [[id number]]  (Abnormal) Collected: 10/11/24 1238    Lab Status: Final result Specimen: Blood from Arm, Right Updated: 10/11/24 1308     PTT 20 seconds     Narrative:      No clots    Basic metabolic panel [[id number]]  (Abnormal) Collected: 10/11/24 1238    Lab Status: Final result Specimen: Blood from Arm, Right Updated: 10/11/24 1305     Sodium 126 mmol/L      Potassium 4.4 mmol/L      Chloride 93 mmol/L      CO2 24 mmol/L      ANION GAP 9 mmol/L      BUN 25 mg/dL      Creatinine 1.10 mg/dL      Glucose 110 mg/dL      Calcium 9.0 mg/dL      eGFR 47 ml/min/1.73sq m     Narrative:      [employer] guidelines for Chronic Kidney Disease (CKD):     Stage 1 with normal or high GFR (GFR > 90 mL/min/1.73 square meters)    Stage 2 Mild CKD (GFR = 60-89 mL/min/1.73 square meters)    Stage 3A Moderate CKD (GFR = 45-59 mL/min/1.73 square meters)    Stage 3B Moderate CKD (GFR = 30-44 mL/min/1.73 square meters)    Stage 4 Severe CKD (GFR = 15-29 mL/min/1.73 square meters)    Stage 5 End Stage CKD (GFR <15 mL/min/1.73 square meters)  Note: GFR calculation is accurate only with a steady state creatinine    Ethanol [[id number]]  (Normal)  Collected: 10/11/24 1238    Lab Status: Final result Specimen: Blood from Arm, Right Updated: 10/11/24 1305     Ethanol Lvl <10 mg/dL     Ammonia [[id number]]  (Abnormal) Collected: 10/11/24 1238    Lab Status: Final result Specimen: Blood from Arm, Right Updated: 10/11/24 1259     Ammonia 10 umol/L     CBC and Platelet [[id number]]  (Abnormal) Collected: 10/11/24 1238    Lab Status: Final result Specimen: Blood from Arm, Right Updated: 10/11/24 1253     WBC 9.05 Thousand/uL      RBC 3.69 Million/uL      Hemoglobin 10.4 g/dL      Hematocrit 30.2 %      MCV 82 fL      MCH 28.2 pg      MCHC 34.4 g/dL      RDW 12.9 %      Platelets 321 Thousands/uL      MPV 8.5 fL     Vitamin B12 [[id number]] Collected: 10/11/24 1238    Lab Status: In process Specimen: Blood from Arm, Right Updated: 10/11/24 1243    Fingerstick Glucose (POCT) [[id number]]  (Normal) Collected: 10/11/24 1215    Lab Status: Final result Specimen: Blood Updated: 10/11/24 1215     POC Glucose 103 mg/dl             XR chest 1 view portable   Final Interpretation by Fritz Saab MD (10/11 6959)      No acute cardiopulmonary disease.            Workstation performed: [id number]         CTA stroke alert (head/neck)   Final Interpretation by E. Alec Schoenberger, MD (10/11 1235)   No significant stenosis of the cervical carotid or vertebral arteries   No significant intracranial stenosis, large vessel occlusion or aneurysm.            After first attempting to call, findings were reported to Waldo Hoffman   via HIPAA compliant secure electronic messaging at 12:28 p.m. who acknowledged receipt of findings at 12:29 p.m.            Workstation performed: [id number]         CT stroke alert brain   Final Interpretation by E. Alec Schoenberger, MD (10/11 1236)      No acute intracranial abnormality.         Findings were reported to Waldo Hoffman   via HIPAA compliant secure electronic messaging at 12:28 p.m. who acknowledged receipt of findings at 12:29 p.m.       Workstation performed: [id number]             ECG 12 Lead Documentation Only    Date/Time: 10/11/2024 1:49 PM    Performed by: Apoorva Alfredo MD  Authorized by: Apoorva Alfredo MD    Indications / Diagnosis:  AMS  ECG reviewed by me, the ED Provider: yes    Patient location:  ED  Previous ECG:     Previous ECG:  Unavailable  Interpretation:     Interpretation: normal    Rate:     ECG rate:  90    ECG rate assessment: normal    Rhythm:     Rhythm: sinus rhythm    Ectopy:     Ectopy: none    QRS:     QRS axis:  Normal    QRS intervals:  Normal  Conduction:     Conduction: normal    ST segments:     ST segments:  Normal  Other findings:     Other findings: prolonged qTc interval        ED Medication and Procedure Management   None     Patient's Medications    No medications on file     No discharge procedures on file.  ED SEPSIS DOCUMENTATION   Time reflects when diagnosis was documented in both MDM as applicable and the Disposition within this note       Time User Action Codes Description Comment    10/11/2024 12:15 PM Apoorva Alfredo [R29.90] Stroke-like symptoms     10/11/2024  2:44 PM Apoorva Alfredo [R41.82] Altered mental status     10/11/2024  2:44 PM Apoorva Alfredo [E87.1] Hyponatremia     10/11/2024  2:44 PM Apoorva Alfredo [D64.9] Anemia                  Apoorva Alfredo MD  10/11/24 3987

## 2024-10-11 NOTE — CONSULTS
NEUROLOGY RESIDENCY CONSULT NOTE     Name: Ashlee Bermeo   Age & Sex: 79 y.o. female   MRN: [id number]  Unit/Bed#: ED-33   Encounter: [id number]  Length of Stay: 0    Recommendations for outpatient neurological follow up have yet to be determined.      Pending for discharge: Pending stroke and toxic metabolic workup    ASSESSMENT & PLAN     * AMS (altered mental status)  Assessment & Plan  Assessment:  Patient is an 79-year-old female that presented to [facility] as a stroke alert in setting of altered mental status.  Patient has a past medical history of hypertension on atenolol.  Patient's last known well was reported to be 10/11/2024 at 0300 where before going to bed the patient had dinner in which she fell with her son.  The patient was found in a confused state on 10/11/2024 at 0830.  Blood pressure 160/62, blood glucose 160, no AC AP at baseline, NIH 14, noncontrast CT head demonstrated no acute intracranial abnormality, CTA head and neck demonstrated no significant intracranial stenosis/LVO or aneurysm.  Patient not a tenecteplase candidate due to being out of time window, not a thrombectomy candidate due to no LVO target.  Per discussion with patient's son, Manuel, at bedside patient had a similar episode in 2021 with the patient was hospitalized at [facility] and was found to be hyponatremic.  Patient was reported to have had other workup completed which was unremarkable and after a few days patient was discharged home.  Was reported to have had a fall 2 weeks ago with unknown head strike.  Patient was reported to have had a cough with questionable bloody stools.    Physical examination demonstrated the patient in significant acute distress, significant confusion, patient had inability to reorient, inability to follow commands, thus limiting ROS, physical examination.  In setting of noncontrast CT head and CTA not demonstrating acute vascular event, recommend MRI of brain and  stroke pathway maintaining systolic blood pressure 140-160, loading with aspirin and clopidogrel and completing toxic metabolic workup per primary team.    Plan:  - Case discussed with attending physician Dr. Hoffman  - Recommend stroke pathway  - Recommend maintaining systolic blood pressure 140-160  - Recommend normoglycemia  - Recommend normothermia  - Recommend MRI brain without contrast  - Recommend 2D echocardiogram without bubble study  - Recommend loading with aspirin 325 mg x 1 today, initiate 81 mg p.o. daily tomorrow  - Recommend loading with clopidogrel 300 mg x 1 now, initiate 75 mg p.o. daily tomorrow  - Recommend obtaining toxic metabolic workup  - Recommend obtaining lipid panel, TSH with free T4, urinalysis, hemoglobin A1c, B12  - Recommend initiating atorvastatin 40 mg p.o. daily if lipid panel is appropriate  - Chemical/mechanical DVT prophylaxis per primary team appreciated  - May consider routine EEG if altered mentation continues  - Recommend stat noncontrast CT head for acute change mental status/neurologic examination, please contact neurology  - Rest per primary team appreciated      SUBJECTIVE     Reason for Consult / Principal Problem: Stroke alert  Hx and PE limited by: Patient altered, unable to follow commands.    HPI: Ashlee eBrmeo is a 79 y.o. female with history of hypertension on atenolol presented to [facility] on 10/11/2024 as a stroke alert.  Per discussion with patient's son, Manuel at bedside, patient was in her last state of health the evening of 10/10/2024 as they were having dinner and watched a movie.  The patient went to bed at 3 AM on 10/11/2024 in her usual state of health.  The patient was found on 10/11/2024 at 0830 in a confused state, and stroke alert was thusly called.  Patient's son reported that they are from [place], has recently traveled here to [place].  Denied antecedent illnesses.  Denied tick bites.  Denied substance use.  Patient had  fall 2 weeks ago, unknown if head strike.  Patient was reported to have had a cough, and questionable bloody stools.    - Stroke alert called: 10/11/2024 1212  - Neurology response: Immediate  - Last known well: 10/11/2024 0300  - Blood pressure in field: 160/62  - Blood glucose in field 160  - No AC/AP at baseline  - NIH 14 as below  - NIH completion: 10/11/2024 1233  - mRS: unknown  - NCCTH: No acute intracranial abnormality.   - CTAHN: No significant stenosis of the cervical carotid or vertebral arteries. No significant intracranial stenosis, large vessel occlusion or aneurysm.  - tNK decision: Not a candidate, out of time window  - Mtx decision: Not a candidate, no target  - EKG: None per review of this encounter    NIHSS:  1a.Level of Consciousness: 0 = Alert   1b. LOC Questions: 2 = Answers neither correctly   1c. LOC Commands: 2 = Obeys neither correctly   2. Best Gaze: 1 = Partial Gaze Palsy (Does NOT look R)   3. Visual: 1 = Partial hemianopia (No BTT in L eye)   4. Facial Palsy: 0=Normal symmetric movement   5a. Motor Right Arm: 2=Some effort against gravity, limb cannot get to or maintain (if cured) 90 (or 45) degrees, drifts down to bed, but has some effort against gravity   5b. Motor Left Arm: 2=Some effort against gravity, limb cannot get to or maintain (if cured) 90 (or 45) degrees, drifts down to bed, but has some effort against gravity   6a. Motor Right Le=Some effort against gravity, limb cannot get to or maintain (if cured) 90 (or 45) degrees, drifts down to bed, but has some effort against gravity   6b. Motor Left Le=Some effort against gravity, limb cannot get to or maintain (if cured) 90 (or 45) degrees, drifts down to bed, but has some effort against gravity   7. Limb Ataxia:  0=Absent   8. Sensory: 0=Normal; no sensory loss   9. Best Language:  0=No aphasia, normal   10. Dysarthria: 0=Normal articulation   11. Extinction and Inattention (formerly Neglect): 0=No abnormality   Total  Score: 14     Inpatient consult to Neurology  Consult performed by: lEier Roque DO  Consult ordered by: Destin Clarke MD        Historical Information   No past medical history on file.  No past surgical history on file.  Social History   Social History     Substance and Sexual Activity   Alcohol Use Not on file     Social History     Substance and Sexual Activity   Drug Use Not on file     No existing history information found.  No existing history information found.  Social History     Tobacco Use   Smoking Status Not on file   Smokeless Tobacco Not on file     Family History: No family history on file.  Meds/Allergies   PTA meds:   None     Not on File    Review of previous medical records was completed.     Review of Systems   Unable to perform ROS: Mental status change     OBJECTIVE     Vitals:   Vitals:    10/11/24 1215 10/11/24 1230 10/11/24 1245 10/11/24 1315   BP: 132/99 133/76 122/76 143/92   Pulse: 67 90 84 79   Resp: 18 18 18 18   Temp: 97.8 °F (36.6 °C)      TempSrc: Oral      SpO2: 99% 99% 100% 99%   Weight: 60.5 kg (133 lb 6.1 oz)         There is no height or weight on file to calculate BMI.   No intake or output data in the 24 hours ending 10/11/24 1406    Temperature:   Temp (24hrs), Av.8 °F (36.6 °C), Min:97.8 °F (36.6 °C), Max:97.8 °F (36.6 °C)    Temperature: 97.8 °F (36.6 °C)    Invasive Devices:   Invasive Devices       Peripheral Intravenous Line  Duration             Peripheral IV 10/11/24 Left Antecubital <1 day                  Physical Exam  Vitals and nursing note reviewed.   HENT:      Head: Normocephalic.      Nose: Nose normal.   Eyes:      General:         Right eye: No discharge.         Left eye: No discharge.   Pulmonary:      Effort: No respiratory distress.   Abdominal:      General: There is no distension.   Musculoskeletal:      Right lower leg: No edema.      Left lower leg: No edema.   Skin:     General: Skin is warm and dry.      Coloration: Skin is not  jaundiced or pale.   Neurological:      Mental Status: She is alert.        Neurologic Exam     Mental Status   Disoriented to person.   Disoriented to place.   Attention: decreased. Concentration: decreased.     Cranial Nerves   Patient does look at examiner when name is called momentarily. Patient does not follow commands in order to complete most of cranial nerve examination.  Patient does not blink to threat in left eye.  No clear facial droop noted.  No nystagmus noted.  Pupils appear equal bilaterally, reactive to light, 3 mm.  Patient is able to move bilateral upper and lower extremities against gravity.     Motor Exam   Patient appears to spontaneously move bilateral upper and lower extremities against gravity, does not follow commands in order to complete strength examination.     Sensory Exam   Patient withdrew in bilateral upper and lower extremities with noxious stimuli were applied.  Patient does not follow commands in order to complete sensory examination.     Gait, Coordination, and Reflexes   Patient does not follow commands in order to complete finger-to-nose heel-to-shin bilaterally.  Patient did not demonstrate intention tremor however demonstrated resting tremor in left upper extremity.  Reflexes in bilateral brachioradialis, biceps, triceps 2+.  Reflexes in bilateral patellar and Achilles 2+.  Plantar reflex normal.  Cynthia's negative bilaterally.  No clonus bilaterally.      LABORATORY DATA     Labs: Studies below reviewed.  Results from last 7 days   Lab Units 10/11/24  1238   WBC Thousand/uL 9.05   HEMOGLOBIN g/dL 10.4*   HEMATOCRIT % 30.2*   PLATELETS Thousands/uL 321      Results from last 7 days   Lab Units 10/11/24  1238   POTASSIUM mmol/L 4.4   CHLORIDE mmol/L 93*   CO2 mmol/L 24   BUN mg/dL 25   CREATININE mg/dL 1.10   CALCIUM mg/dL 9.0              Results from last 7 days   Lab Units 10/11/24  1238   INR  0.93   PTT seconds 20*               IMAGING & DIAGNOSTIC TESTING      Radiology Results: Results Review Statement: I reviewed radiology reports from this admission including: CT head and CT angiography.  XR chest 1 view portable   Final Result by Fritz Saab MD (10/11 5267)      No acute cardiopulmonary disease.            Workstation performed: [id number]         CTA stroke alert (head/neck)   Final Result by E. Alec Schoenberger, MD (10/11 4217)   No significant stenosis of the cervical carotid or vertebral arteries   No significant intracranial stenosis, large vessel occlusion or aneurysm.            After first attempting to call, findings were reported to Waldo Hoffman   via HIPAA compliant secure electronic messaging at 12:28 p.m. who acknowledged receipt of findings at 12:29 p.m.            Workstation performed: [id number]         CT stroke alert brain   Final Result by E. Alec Schoenberger, MD (10/11 8123)      No acute intracranial abnormality.         Findings were reported to Waldo Hoffman   via HIPAA compliant secure electronic messaging at 12:28 p.m. who acknowledged receipt of findings at 12:29 p.m.      Workstation performed: [id number]           Other Diagnostic Testing: Results Review Statement: I reviewed radiology reports from this admission including: CT head and CT angiography.    ACTIVE MEDICATIONS       Current Facility-Administered Medications:     aspirin tablet 325 mg, Once    clopidogrel (PLAVIX) tablet 300 mg, Once    ondansetron (ZOFRAN) injection 4 mg, Once    Prior to Admission medications    Not on File     CODE STATUS & ADVANCED DIRECTIVES     Code Status: No Order  Advance Directive and Living Will:      Power of :    POLST:        VTE Pharmacologic Prophylaxis: Per primary team  VTE Mechanical Prophylaxis: Per primary team    ======    I have discussed the patient's history, physical exam findings, assessment, and plan in detail with attending, Dr. Hoffman    Thank you for allowing me to participate in the care of your patient, Ashlee  Elvie.    Elier Roque DO  [facility] Neurology Residency, PGY-4

## 2024-10-11 NOTE — ASSESSMENT & PLAN NOTE
History confirmed with PCP office  PTA meds atenolol 25 mg daily  Pt was hypertensive on admission with , improved without intervention to , possibly agitation contributing, allow permissive hypertension for the next 24 h  Restart atenolol tomorrow afternoon if stroke rulled out and pt is able to take po meds

## 2024-10-11 NOTE — ASSESSMENT & PLAN NOTE
POA with confusion and sodium 126  Similar presentation in 2021 as per pt's son, mentation improved at that time with sodium normalization  H/o of fall about 2.5 weeks ago, pt had mild pain since then and was taking tylenol, denies NSAIDs  Pt's son denies poor oral intake, nausea, vomiting or diarrhea  Pt look on the dry side on physical exam  Pt received  cc bolus in ED, Na+ improved to 129, we'll hold of on further fluids for now  Na+ goal 134 by tomorrow afternoon  Ddx: SIADH in settings of recent fall and pain vs undiagnosed malignancy (positive coluguard,sister has stomach ca at age of 40) vs tea/toast syndrome vs hypocortisolism vs hypothyroidism is mild less likely contributing    Plan:  Follow BMP Q 4,   Follow urinary sodium  Follow serum osm  Follow urinary osm  Follow am cortisol  Proceed with CT CAP

## 2024-10-11 NOTE — ASSESSMENT & PLAN NOTE
Lab Results   Component Value Date    EGFR 41 10/11/2024    EGFR 47 10/11/2024    CREATININE 1.23 10/11/2024    CREATININE 1.10 10/11/2024

## 2024-10-11 NOTE — ED ATTENDING ATTESTATION
"10/11/2024  I, Destin Clarke MD, saw and evaluated the patient. I have discussed the patient with the resident/non-physician practitioner and agree with the resident's/non-physician practitioner's findings, Plan of Care, and MDM as documented in the resident's/non-physician practitioner's note, except where noted. All available labs and Radiology studies were reviewed.  I was present for key portions of any procedure(s) performed by the resident/non-physician practitioner and I was immediately available to provide assistance.       At this point I agree with the current assessment done in the Emergency Department.  I have conducted an independent evaluation of this patient a history and physical is as follows:  Briefly, 79-year-old female presenting with altered mental status.  Patient was last seen normal at 3 this morning per son, found to be altered this morning when she woke up.  Patient was repeating \"okay\" over and over again, not speaking otherwise.  Intermittently obeys commands.  Per EMS, not looking towards the right side.  Per son, patient had 1 similar episode several years ago which was associated with a low sodium.  No trauma, drug use, or other clear precipitating factors per son.  Patient unable to provide history.  On examination, patient moving all 4 extremities, cranial nerves II through XII grossly intact, patient seems to prefer leftward gaze but with painful stimulus does turn her head and look towards the right and gaze does cross the midline..  Initially stroke alert called with some concern for neglect and aphasia, initially imaging reassuring, patient is not a candidate for TNK based on timing of symptoms.  No large vessel occlusion identified on CT angiogram.  Lab workup is consistent with significant hyponatremia, overall picture suspicious for toxic metabolic encephalopathy versus potentially decompensation of underlying psychiatric disease or dementia.  Admitted to internal " medicine with neurology consulting, with plan for MRI for further assessment.  Hemodynamically stable and comfortable at time of admit.  ED Course         Critical Care Time  Procedures

## 2024-10-11 NOTE — QUICK NOTE
I was notified by nurse that pt spiked fever.  Evaluated pt at the bedside, pt is calmer, however is still confused, face is flushed erythematous.  Vitals are stable, SBP in in 160s, hHR in 90s, RR 28, pt was placed on 2 L NC however it was displaced from nostrils when I arrived and pt was saturating 100%  Pt unable to answer questions.  Physical exam is complicated by constant movement and talking, pt is non-cooperative.  No obvious wheezing, rales or rhonchi on lungs auscultation  No obvious pathologic murmurs, abdomen is soft, no obvious guarding.    Unclear if fever is infectious in nature vs allergic reaction(given extensive allergy list and that pt got iodine contrast for CAT when arrived).Now pt is meeting SIRS criteria, we'll initiate septic work up    In settings of recent fall, possible atelectasis or aspiration could not be excluded (CXR was unremarkable though). Covid/Flu/RSV negative. We'll initiate broad spectrum Abx.    Plan:  Monitor vitals and fever curve  Provide O2 support with NC as needed, wean off as tolerating  Obtain blood culture  Follow proCal  Follow CT CAP report  Give tylenol suppositorium  Start Ceftiraxone  Give Benadryl 50 mg IV

## 2024-10-12 ENCOUNTER — APPOINTMENT (INPATIENT)
Dept: RADIOLOGY | Facility: HOSPITAL | Age: 79
DRG: 640 | End: 2024-10-12
Payer: MEDICARE

## 2024-10-12 ENCOUNTER — APPOINTMENT (INPATIENT)
Dept: NON INVASIVE DIAGNOSTICS | Facility: HOSPITAL | Age: 79
DRG: 640 | End: 2024-10-12
Payer: MEDICARE

## 2024-10-12 ENCOUNTER — APPOINTMENT (INPATIENT)
Dept: VASCULAR ULTRASOUND | Facility: HOSPITAL | Age: 79
DRG: 640 | End: 2024-10-12
Payer: MEDICARE

## 2024-10-12 PROBLEM — D72.829 LEUKOCYTOSIS: Status: ACTIVE | Noted: 2024-10-12

## 2024-10-12 PROBLEM — N17.9 AKI (ACUTE KIDNEY INJURY) (HCC): Status: ACTIVE | Noted: 2024-10-12

## 2024-10-12 PROBLEM — E53.8 B12 DEFICIENCY: Status: ACTIVE | Noted: 2024-10-12

## 2024-10-12 LAB
ANION GAP SERPL CALCULATED.3IONS-SCNC: 6 MMOL/L (ref 4–13)
ANION GAP SERPL CALCULATED.3IONS-SCNC: 7 MMOL/L (ref 4–13)
ANION GAP SERPL CALCULATED.3IONS-SCNC: 8 MMOL/L (ref 4–13)
AORTIC ROOT: 3.3 CM
APICAL FOUR CHAMBER EJECTION FRACTION: 66 %
ASCENDING AORTA: 3.1 CM
BASOPHILS # BLD AUTO: 0.05 THOUSANDS/ΜL (ref 0–0.1)
BASOPHILS NFR BLD AUTO: 0 % (ref 0–1)
BSA FOR ECHO PROCEDURE: 1.53 M2
BUN SERPL-MCNC: 24 MG/DL (ref 5–25)
BUN SERPL-MCNC: 26 MG/DL (ref 5–25)
BUN SERPL-MCNC: 29 MG/DL (ref 5–25)
CALCIUM SERPL-MCNC: 8.2 MG/DL (ref 8.4–10.2)
CALCIUM SERPL-MCNC: 8.6 MG/DL (ref 8.4–10.2)
CALCIUM SERPL-MCNC: 8.8 MG/DL (ref 8.4–10.2)
CHLORIDE SERPL-SCNC: 97 MMOL/L (ref 96–108)
CHLORIDE SERPL-SCNC: 97 MMOL/L (ref 96–108)
CHLORIDE SERPL-SCNC: 99 MMOL/L (ref 96–108)
CHOLEST SERPL-MCNC: 168 MG/DL
CO2 SERPL-SCNC: 26 MMOL/L (ref 21–32)
CREAT SERPL-MCNC: 1.41 MG/DL (ref 0.6–1.3)
CREAT SERPL-MCNC: 1.42 MG/DL (ref 0.6–1.3)
CREAT SERPL-MCNC: 1.51 MG/DL (ref 0.6–1.3)
DOP CALC LVOT AREA: 2.54 CM2
DOP CALC LVOT DIAMETER: 1.8 CM
E WAVE DECELERATION TIME: 301 MS
E/A RATIO: 0.87
EOSINOPHIL # BLD AUTO: 0.02 THOUSAND/ΜL (ref 0–0.61)
EOSINOPHIL NFR BLD AUTO: 0 % (ref 0–6)
ERYTHROCYTE [DISTWIDTH] IN BLOOD BY AUTOMATED COUNT: 13.1 % (ref 11.6–15.1)
EST. AVERAGE GLUCOSE BLD GHB EST-MCNC: 114 MG/DL
FERRITIN SERPL-MCNC: 368 NG/ML (ref 11–307)
FOLATE SERPL-MCNC: 16.2 NG/ML
FRACTIONAL SHORTENING: 30 (ref 28–44)
GFR SERPL CREATININE-BSD FRML MDRD: 32 ML/MIN/1.73SQ M
GFR SERPL CREATININE-BSD FRML MDRD: 34 ML/MIN/1.73SQ M
GFR SERPL CREATININE-BSD FRML MDRD: 35 ML/MIN/1.73SQ M
GLUCOSE SERPL-MCNC: 101 MG/DL (ref 65–140)
GLUCOSE SERPL-MCNC: 92 MG/DL (ref 65–140)
GLUCOSE SERPL-MCNC: 94 MG/DL (ref 65–140)
HBA1C MFR BLD: 5.6 %
HCT VFR BLD AUTO: 30.4 % (ref 34.8–46.1)
HDLC SERPL-MCNC: 43 MG/DL
HGB BLD-MCNC: 10.4 G/DL (ref 11.5–15.4)
IMM GRANULOCYTES # BLD AUTO: 0.04 THOUSAND/UL (ref 0–0.2)
IMM GRANULOCYTES NFR BLD AUTO: 0 % (ref 0–2)
INTERVENTRICULAR SEPTUM IN DIASTOLE (PARASTERNAL SHORT AXIS VIEW): 1.1 CM
INTERVENTRICULAR SEPTUM: 1.1 CM (ref 0.6–1.1)
IRON SATN MFR SERPL: 16 % (ref 15–50)
IRON SERPL-MCNC: 53 UG/DL (ref 50–212)
LAAS-AP2: 16.4 CM2
LAAS-AP4: 19.7 CM2
LDLC SERPL CALC-MCNC: 101 MG/DL (ref 0–100)
LEFT ATRIUM AREA SYSTOLE SINGLE PLANE A4C: 18 CM2
LEFT ATRIUM SIZE: 3.1 CM
LEFT ATRIUM VOLUME (MOD BIPLANE): 53 ML
LEFT ATRIUM VOLUME INDEX (MOD BIPLANE): 34.6 ML/M2
LEFT INTERNAL DIMENSION IN SYSTOLE: 2.3 CM (ref 2.1–4)
LEFT VENTRICULAR INTERNAL DIMENSION IN DIASTOLE: 3.3 CM (ref 3.5–6)
LEFT VENTRICULAR POSTERIOR WALL IN END DIASTOLE: 0.8 CM
LEFT VENTRICULAR STROKE VOLUME: 26 ML
LVSV (TEICH): 26 ML
LYMPHOCYTES # BLD AUTO: 1.24 THOUSANDS/ΜL (ref 0.6–4.47)
LYMPHOCYTES NFR BLD AUTO: 10 % (ref 14–44)
MAGNESIUM SERPL-MCNC: 1.9 MG/DL (ref 1.9–2.7)
MCH RBC QN AUTO: 28.5 PG (ref 26.8–34.3)
MCHC RBC AUTO-ENTMCNC: 34.2 G/DL (ref 31.4–37.4)
MCV RBC AUTO: 83 FL (ref 82–98)
MONOCYTES # BLD AUTO: 1.04 THOUSAND/ΜL (ref 0.17–1.22)
MONOCYTES NFR BLD AUTO: 8 % (ref 4–12)
MV E'TISSUE VEL-SEP: 8 CM/S
MV PEAK A VEL: 1.08 M/S
MV PEAK E VEL: 94 CM/S
MV STENOSIS PRESSURE HALF TIME: 87 MS
MV VALVE AREA P 1/2 METHOD: 2.53
NEUTROPHILS # BLD AUTO: 10.11 THOUSANDS/ΜL (ref 1.85–7.62)
NEUTS SEG NFR BLD AUTO: 82 % (ref 43–75)
NRBC BLD AUTO-RTO: 0 /100 WBCS
OSMOLALITY UR/SERPL-RTO: 286 MMOL/KG (ref 282–298)
PLATELET # BLD AUTO: 262 THOUSANDS/UL (ref 149–390)
PMV BLD AUTO: 8.3 FL (ref 8.9–12.7)
POTASSIUM SERPL-SCNC: 3.8 MMOL/L (ref 3.5–5.3)
POTASSIUM SERPL-SCNC: 4.2 MMOL/L (ref 3.5–5.3)
POTASSIUM SERPL-SCNC: 4.3 MMOL/L (ref 3.5–5.3)
RA PRESSURE ESTIMATED: 5 MMHG
RBC # BLD AUTO: 3.65 MILLION/UL (ref 3.81–5.12)
RIGHT ATRIUM AREA SYSTOLE A4C: 13.9 CM2
RIGHT VENTRICLE ID DIMENSION: 3.1 CM
RV PSP: 27 MMHG
SINOTUBULAR JUNCTION: 2.8 CM
SL CV LEFT ATRIUM LENGTH A2C: 4.7 CM
SL CV LV EF: 65
SL CV PED ECHO LEFT VENTRICLE DIASTOLIC VOLUME (MOD BIPLANE) 2D: 44 ML
SL CV PED ECHO LEFT VENTRICLE SYSTOLIC VOLUME (MOD BIPLANE) 2D: 17 ML
SL CV SINUS OF VALSALVA 2D: 3.2 CM
SODIUM SERPL-SCNC: 130 MMOL/L (ref 135–147)
SODIUM SERPL-SCNC: 131 MMOL/L (ref 135–147)
SODIUM SERPL-SCNC: 131 MMOL/L (ref 135–147)
STJ: 2.8 CM
TIBC SERPL-MCNC: 333 UG/DL (ref 250–450)
TR MAX PG: 22 MMHG
TR PEAK VELOCITY: 2.3 M/S
TRICUSPID ANNULAR PLANE SYSTOLIC EXCURSION: 3.2 CM
TRICUSPID VALVE PEAK REGURGITATION VELOCITY: 2.32 M/S
TRIGL SERPL-MCNC: 119 MG/DL
UIBC SERPL-MCNC: 280 UG/DL (ref 155–355)
WBC # BLD AUTO: 12.5 THOUSAND/UL (ref 4.31–10.16)

## 2024-10-12 PROCEDURE — 85025 COMPLETE CBC W/AUTO DIFF WBC: CPT | Performed by: STUDENT IN AN ORGANIZED HEALTH CARE EDUCATION/TRAINING PROGRAM

## 2024-10-12 PROCEDURE — 93970 EXTREMITY STUDY: CPT

## 2024-10-12 PROCEDURE — 80061 LIPID PANEL: CPT | Performed by: STUDENT IN AN ORGANIZED HEALTH CARE EDUCATION/TRAINING PROGRAM

## 2024-10-12 PROCEDURE — 80048 BASIC METABOLIC PNL TOTAL CA: CPT

## 2024-10-12 PROCEDURE — 92610 EVALUATE SWALLOWING FUNCTION: CPT

## 2024-10-12 PROCEDURE — 93306 TTE W/DOPPLER COMPLETE: CPT | Performed by: INTERNAL MEDICINE

## 2024-10-12 PROCEDURE — 73564 X-RAY EXAM KNEE 4 OR MORE: CPT

## 2024-10-12 PROCEDURE — 93306 TTE W/DOPPLER COMPLETE: CPT

## 2024-10-12 PROCEDURE — 83036 HEMOGLOBIN GLYCOSYLATED A1C: CPT | Performed by: STUDENT IN AN ORGANIZED HEALTH CARE EDUCATION/TRAINING PROGRAM

## 2024-10-12 PROCEDURE — 93970 EXTREMITY STUDY: CPT | Performed by: SURGERY

## 2024-10-12 PROCEDURE — 83930 ASSAY OF BLOOD OSMOLALITY: CPT | Performed by: STUDENT IN AN ORGANIZED HEALTH CARE EDUCATION/TRAINING PROGRAM

## 2024-10-12 PROCEDURE — 83735 ASSAY OF MAGNESIUM: CPT | Performed by: STUDENT IN AN ORGANIZED HEALTH CARE EDUCATION/TRAINING PROGRAM

## 2024-10-12 PROCEDURE — 99233 SBSQ HOSP IP/OBS HIGH 50: CPT | Performed by: PSYCHIATRY & NEUROLOGY

## 2024-10-12 PROCEDURE — 80048 BASIC METABOLIC PNL TOTAL CA: CPT | Performed by: STUDENT IN AN ORGANIZED HEALTH CARE EDUCATION/TRAINING PROGRAM

## 2024-10-12 RX ORDER — SODIUM CHLORIDE 9 MG/ML
50 INJECTION, SOLUTION INTRAVENOUS CONTINUOUS
Status: DISCONTINUED | OUTPATIENT
Start: 2024-10-12 | End: 2024-10-13

## 2024-10-12 RX ORDER — CYANOCOBALAMIN 1000 UG/ML
1000 INJECTION, SOLUTION INTRAMUSCULAR; SUBCUTANEOUS DAILY
Status: COMPLETED | OUTPATIENT
Start: 2024-10-12 | End: 2024-10-15

## 2024-10-12 RX ADMIN — CYANOCOBALAMIN 1000 MCG: 1000 INJECTION, SOLUTION INTRAMUSCULAR; SUBCUTANEOUS at 08:53

## 2024-10-12 RX ADMIN — CEFTRIAXONE 1000 MG: 2 INJECTION, POWDER, FOR SOLUTION INTRAMUSCULAR; INTRAVENOUS at 17:02

## 2024-10-12 RX ADMIN — SODIUM CHLORIDE 50 ML/HR: 0.9 INJECTION, SOLUTION INTRAVENOUS at 06:58

## 2024-10-12 NOTE — ASSESSMENT & PLAN NOTE
POA with confusion, delirium starting this am, last well known yesterday as per son  Denies any sx of URI, c/o of dysuria, chest pain, fever, chills or any other complains  Pt's so admits mild cough yesterday with no fever  POA patient was disoriented, delirious, restless and agitated, unable to answer questions or to provide history.   TSH elevated at 5.3, but T4 normal.  Normal UA  Normal coma panel, normal ammonia  CTH and CTA h&n showed no acute bleeding or significant occlusion  S/p  cc bolus in ED, load dose of asa 325 and plavix 300, ativan 0.5 for agitation  Mentation significantly improved, AAO x 2.  Mental status baseline confirmed at bedside with son  Venous complex lower extremities negative for acute or chronic DVT bilaterally  Altered mental status likely due to hyponatremia, however stroke, infectious process will be ruled out    Plan:  Admit for stroke pathway  Management of hyponatremia as above  Neurology consulted, recs appreciated  EEG ordered  Follow-up MRI brain  Follow-up echo  Follow daily BMP, replete electrolytes as needed  Monitor patient on telemetry for 24 hours  PT/OT eval   Fall precautions  Follow CT CAP w/o contrast

## 2024-10-12 NOTE — ASSESSMENT & PLAN NOTE
History of cough x 1 day, afebrile  Chest x-ray unremarkable.  COVID/flu/RSV negative  Procalcitonin negative  Leukocytosis of 12.5  Continue empiric treatment with Rocephin  Follow CT C/A/P

## 2024-10-12 NOTE — SPEECH THERAPY NOTE
Speech Language Pathology    Speech Language Pathology Bedside Swallow Evaluation      Patient Name: Ashlee Bermeo  Today's Date: 10/12/2024    Recommend:  Regular diet and Thin liquids  Medications one at a time with fluids  Standard aspiration and reflux precautions  RD for supplements    Discussed with patient, her son, MD, and RN re: ST findings and recommendations.  There are no further skilled ST needs at this time.     Problem List  Principal Problem:    AMS (altered mental status)  Active Problems:    Hyponatremia    HTN (hypertension)    HLD (hyperlipidemia)    Hypothyroid    CKD (chronic kidney disease)    H/O fall    Anemia    Cough    DEMI (acute kidney injury) (HCC)    Leukocytosis    Past Medical History  No past medical history on file.  Past Surgical History  No past surgical history on file.     10/12/24 8314   Patient Information   Current Medical 79 year-old female admitted secondary to change in mental status.   Special Studies CTH and CTA showed no acute bleeding or significant occlusion    Past Medical History HTN, HLD, hypothyroid, CKD, anemia, and as outlined above   Social History Patient resides at home with her son   Swallow Information   Current Risks for Dysphagia & Aspiration Mental status change   Current Diet NPO   Baseline Diet Regular; Thin liquids   Baseline Assessment   Behavior/Cognition Alert;Cooperative;Interactive   Speech/Language Status Within functional limits   Patient Positioning Upright in bed   Swallow Mechanism Exam   Labial Strength WFL   Labial ROM WFL   Lingual Strength WFL   Lingual ROM WFL   Velum WFL   Gag WFL   Mandible WFL   Dentition Adequate   Volitional Cough Strong   Consistencies Assessed and Performance   Materials Administered Puree/Level 1;Mechanical Soft/Level 2;Soft/Level 3;Regular/Solid; Thin liquids   Oral Stage WFL   Pharyngeal Stage WFL   Pharyngeal Stage Comment There were no clinical overt signs or symptoms of aspiration following all swallows of  puree, soft/regular solids or thin liquids.   Swallow Mechanics WFL   Esophageal Concerns C/O water feeling stuck in cervical esophagus   Strategies and Efficacy Small bites/ sips, slow rate, alternate food/ liquid intakes   Summary   Swallow Summary Oral and pharyngeal swallowing skills are safe/ WFL for regular consistency solids and thin/ all liquids.   Recommendations   Risk for Aspiration None   Recommendations Oral diet   Diet Solid Recommendation Regular consistency   Diet Liquid Recommendation Thin liquids   Recommended Form of Medications Whole with thin liquids 1 at a time   General Precautions Aspiration precautions; Feed only when alert; Minimize distractions; Upright as possible for all oral intakes; Remain upright for 45 minutes after meals; Assist with feeding   Compensatory Swallowing Strategies Alternate solids and liquids; External pacing, Slow rate   Further Evaluations Dietitian   Results Reviewed with RN, MD, patient, and son   Treatment Recommendations   Duration of treatment N/A   Follow up treatments None currently   Dysphagia Goals [facility] 10/12   Speech Therapy Prognosis   Prognosis Good   Prognosis Considerations Age; Co-Morbidities; Medical Diagnosis; Previous Level of Function

## 2024-10-12 NOTE — ASSESSMENT & PLAN NOTE
History confirmed with PCP office  PTA meds atenolol 25 mg daily  Pt was hypertensive on admission with , improved without intervention to , possibly agitation contributing, allow permissive hypertension for the next 24 h  Restart atenolol if stroke rulled out and pt is able to take po meds

## 2024-10-12 NOTE — PROGRESS NOTES
Progress Note - Hospitalist   Name: Ashlee Bermeo 79 y.o. female I MRN: [id number]  Unit/Bed#: S -01 I Date of Admission: 10/11/2024   Date of Service: 10/12/2024 I Hospital Day: 1    Assessment & Plan  AMS (altered mental status)  POA with confusion, delirium starting this am, last well known yesterday as per son  Denies any sx of URI, c/o of dysuria, chest pain, fever, chills or any other complains  Pt's so admits mild cough yesterday with no fever  POA patient was disoriented, delirious, restless and agitated, unable to answer questions or to provide history.   TSH elevated at 5.3, but T4 normal.  Normal UA  Normal coma panel, normal ammonia  CTH and CTA h&n showed no acute bleeding or significant occlusion  S/p  cc bolus in ED, load dose of asa 325 and plavix 300, ativan 0.5 for agitation  Mentation significantly improved, AAO x 2.  Mental status baseline confirmed at bedside with son  Venous complex lower extremities negative for acute or chronic DVT bilaterally  Altered mental status likely due to hyponatremia, however stroke, infectious process will be ruled out    Plan:  Admit for stroke pathway  Management of hyponatremia as above  Neurology consulted, recs appreciated  EEG ordered  Follow-up MRI brain  Follow-up echo  Follow daily BMP, replete electrolytes as needed  Monitor patient on telemetry for 24 hours  PT/OT eval   Fall precautions  Follow CT CAP w/o contrast    Hyponatremia  POA with confusion and sodium 126  Similar presentation in 2021 as per pt's son, mentation improved at that time with sodium normalization  H/o of fall about 2.5 weeks ago, pt had mild pain since then and was taking tylenol, denies NSAIDs  Hypovolemic on exam  Serum osmolality normal at 286  Recent Labs     10/11/24  2321 10/12/24  0349 10/12/24  1012   SODIUM 131* 131* 131*     Sodium stabilized and improving    Plan:  BMP every 12 hours  Follow CT C/A/P    DEMI (acute kidney injury) (HCC)  Creatinine baseline ~  "1.1-.13   Increased creatinine to 1.51 -> 1.41  Likely prerenal in setting of hypovolemia on exam and poor p.o. intake  Continue IV normal saline maintenance rate  Leukocytosis  Leukocytosis of 12.5 with neutrophils and segmented  Unknown source  Chest x-ray unremarkable, UA normal  Continue empiric IV Rocephin  Monitor vital signs and CBC  Anemia  POA with HGB 10.4, normocytic, RDW within normal range  Reported black stools by pt's son  H/o of CKD 3  LDH normal, folate normal, iron panel within normal limits  Follow daily CBC, transfuse if HGB <7  Recommend GI follow up in o/p settings    HTN (hypertension)  History confirmed with PCP office  PTA meds atenolol 25 mg daily  Pt was hypertensive on admission with , improved without intervention to , possibly agitation contributing, allow permissive hypertension for the next 24 h  Restart atenolol if stroke rulled out and pt is able to take po meds  HLD (hyperlipidemia)  As per PCP office pt is not on statin  Follow lipid panel  Hypothyroid  Not taking levothyroxine as per PCP office  TSH is slightly elevated to 5.3  Normal free T4  CKD (chronic kidney disease)  Lab Results   Component Value Date    EGFR 32 10/12/2024    EGFR 32 10/11/2024    EGFR 35 10/11/2024    CREATININE 1.51 (H) 10/12/2024    CREATININE 1.49 (H) 10/11/2024    CREATININE 1.39 (H) 10/11/2024   Avoid hypotension and nephrotoxic medications  H/O fall  Pt's son reported fall about 2.5 weeks ago on her \"knees and hands\", managed with tylenol  Pt was complaining of R shoulder and right lateral chest wall pain (possibly ribs)  Xray chest was unremarkable  Obtain Right shoulder Xray  Right knee x-ray ordered  Tylenol for pain prn  Voltaren gel and lidocaine patch prn    Cough  History of cough x 1 day, afebrile  Chest x-ray unremarkable.  COVID/flu/RSV negative  Procalcitonin negative  Leukocytosis of 12.5  Continue empiric treatment with Rocephin  Follow CT C/A/P      VTE Pharmacologic " Prophylaxis: VTE Score: 8 High Risk (Score >/= 5) - Pharmacological DVT Prophylaxis Ordered: enoxaparin (Lovenox). Sequential Compression Devices Ordered.    Mobility:   Basic Mobility Inpatient Raw Score: 9  -HLM Goal: 3: Sit at edge of bed  JH-HLM Achieved: 1: Laying in bed  JH-HLM Goal NOT achieved. Continue with multidisciplinary rounding and encourage appropriate mobility to improve upon JH-HLM goals.    Patient Centered Rounds: I performed bedside rounds with nursing staff today.   Discussions with Specialists or Other Care Team Provider: Neurology    Education and Discussions with Family / Patient: Updated  (son) at bedside.    Current Length of Stay: 1 day(s)  Current Patient Status: Inpatient   Certification Statement: The patient will continue to require additional inpatient hospital stay due to ongoing management  Discharge Plan: Anticipate discharge in 24-48 hrs to discharge location to be determined pending rehab evaluations.    Code Status: Level 1 - Full Code    Subjective   Evaluated at bedside.  Patient reports feeling well.  Denies abdominal patient the hospital she reports that it was due to coughing.  No overnight events.   Son is at bedside reports patient is almost back to baseline.  Patient had a fall a week ago and since then has been complaining of right knee pain.    Objective :  Temp:  [97.6 °F (36.4 °C)-101.2 °F (38.4 °C)] 98.5 °F (36.9 °C)  HR:  [59-99] 64  BP: (113-198)/(43-99) 113/43  Resp:  [18-28] 18  SpO2:  [94 %-100 %] 95 %  O2 Device: None (Room air)  Nasal Cannula O2 Flow Rate (L/min):  [2 L/min] 2 L/min    Body mass index is 22.91 kg/m².     Input and Output Summary (last 24 hours):     Intake/Output Summary (Last 24 hours) at 10/12/2024 1032  Last data filed at 10/12/2024 0900  Gross per 24 hour   Intake 280 ml   Output 500 ml   Net -220 ml       Physical Exam  Constitutional:       General: She is not in acute distress.     Appearance: Normal appearance.    HENT:      Head: Normocephalic and atraumatic.      Mouth/Throat:      Mouth: Mucous membranes are dry.   Cardiovascular:      Rate and Rhythm: Normal rate and regular rhythm.      Pulses: Normal pulses.      Heart sounds: Normal heart sounds.   Pulmonary:      Effort: Pulmonary effort is normal.      Breath sounds: Normal breath sounds. No rales.   Abdominal:      General: Bowel sounds are normal.      Tenderness: There is no abdominal tenderness.   Musculoskeletal:      Right knee: Ecchymosis and bony tenderness present. Tenderness present over the lateral joint line and patellar tendon.      Left knee: Normal.      Right lower leg: No edema.      Left lower leg: No edema.   Skin:     General: Skin is warm.      Capillary Refill: Capillary refill takes less than 2 seconds.   Neurological:      Mental Status: She is alert and oriented to person, place, and time.         Lines/Drains:        Telemetry:  Telemetry Orders (From admission, onward)               24 Hour Telemetry Monitoring  Continuous x 24 Hours (Telem)        Expiring   Question:  Reason for 24 Hour Telemetry  Answer:  TIA/Suspected CVA/ Confirmed CVA                     Telemetry Reviewed: Normal Sinus Rhythm  Indication for Continued Telemetry Use: Metabolic/electrolyte disturbance with high probability of dysrhythmia               Lab Results: I have reviewed the following results:   Results from last 7 days   Lab Units 10/12/24  0349   WBC Thousand/uL 12.50*   HEMOGLOBIN g/dL 10.4*   HEMATOCRIT % 30.4*   PLATELETS Thousands/uL 262   SEGS PCT % 82*   LYMPHO PCT % 10*   MONO PCT % 8   EOS PCT % 0     Results from last 7 days   Lab Units 10/12/24  0349   SODIUM mmol/L 131*   POTASSIUM mmol/L 4.2   CHLORIDE mmol/L 97   CO2 mmol/L 26   BUN mg/dL 26*   CREATININE mg/dL 1.51*   ANION GAP mmol/L 8   CALCIUM mg/dL 8.8   GLUCOSE RANDOM mg/dL 92     Results from last 7 days   Lab Units 10/11/24  1238   INR  0.93     Results from last 7 days   Lab Units  10/11/24  1215   POC GLUCOSE mg/dl 103         Results from last 7 days   Lab Units 10/11/24  1238   PROCALCITONIN ng/ml <0.05       Recent Cultures (last 7 days):   Results from last 7 days   Lab Units 10/11/24  2009   BLOOD CULTURE  Received in Microbiology Lab. Culture in Progress.  Received in Microbiology Lab. Culture in Progress.             Last 24 Hours Medication List:     Current Facility-Administered Medications:     acetaminophen (TYLENOL) rectal suppository 650 mg, Q6H PRN    [Held by provider] aspirin chewable tablet 81 mg, Daily    aspirin tablet 325 mg, Once    [Held by provider] atenolol (TENORMIN) tablet 25 mg, Daily    ceftriaxone (ROCEPHIN) 1 g/50 mL in dextrose IVPB, Q24H, Last Rate: 1,000 mg (10/11/24 2022)    clopidogrel (PLAVIX) tablet 300 mg, Once    [Held by provider] clopidogrel (PLAVIX) tablet 75 mg, Daily    cyanocobalamin injection 1,000 mcg, Daily **FOLLOWED BY** [START ON 10/16/2024] cyanocobalamin (VITAMIN B-12) tablet 1,000 mcg, Daily    diphenhydrAMINE (BENADRYL) injection 50 mg, Q6H PRN    enoxaparin (LOVENOX) subcutaneous injection 30 mg, Daily    hydrALAZINE (APRESOLINE) injection 5 mg, Q6H PRN    sodium chloride 0.9 % infusion, Continuous, Last Rate: 50 mL/hr (10/12/24 0658)    Administrative Statements   Today, Patient Was Seen By: Angelica Ayon MD      **Please Note: This note may have been constructed using a voice recognition system.**

## 2024-10-12 NOTE — ASSESSMENT & PLAN NOTE
Creatinine baseline ~ 1.1-.13   Increased creatinine to 1.51 -> 1.41  Likely prerenal in setting of hypovolemia on exam and poor p.o. intake  Continue IV normal saline maintenance rate

## 2024-10-12 NOTE — PROGRESS NOTES
Progress Note - Neurology   Name: Ashlee Bermeo 79 y.o. female I MRN: [id number]  Unit/Bed#: S -01 I Date of Admission: 10/11/2024   Date of Service: 10/12/2024 I Hospital Day: 1     Assessment & Plan  AMS (altered mental status)  Assessment:  Patient is an 79-year-old female that presented to [facility] as a stroke alert in setting of altered mental status.  Patient has a past medical history of hypertension on atenolol.  Patient's last known well was reported to be 10/11/2024 at 0300 where before going to bed the patient had dinner in which she fell with her son.  The patient was found in a confused state on 10/11/2024 at 0830.  Blood pressure 160/62, blood glucose 160, no AC AP at baseline, NIH 14, noncontrast CT head demonstrated no acute intracranial abnormality, CTA head and neck demonstrated no significant intracranial stenosis/LVO or aneurysm.  Patient not a tenecteplase candidate due to being out of time window, not a thrombectomy candidate due to no LVO target.  Per discussion with patient's son, Manuel, at bedside patient had a similar episode in 2021 with the patient was hospitalized at [facility] and was found to be hyponatremic.  Patient was reported to have had other workup completed which was unremarkable and after a few days patient was discharged home.  Was reported to have had a fall 2 weeks ago with unknown head strike.  Patient was reported to have had a cough with questionable bloody stools.    Initial examination demonstrated the patient in significant acute distress, significant confusion, patient had inability to reorient, inability to follow commands, thus limiting ROS, physical examination, but update as of 10/12 AM with pt's son at bedside - AAOx4 with improved mentation though slightly slowed response but also noting [place], no focal lateralizing deficits. Son reports pt very close to baseline now. Na level also improved from 126 to 131 as of this  AM.    TTE, 10/12/24: EF 65%, normal systolic fxn, G1DD, mildly dilated LA, no major valvular dysfunction, IVC normal size.  Telemetry as of 10/12AM - No cardiac arrhythmia.    Lab Results   Component Value Date    CHOLESTEROL 168 10/12/2024    TRIG 119 10/12/2024    HDL 43 (L) 10/12/2024    LDLCALC 101 (H) 10/12/2024     Lab Results   Component Value Date    HGBA1C 5.6 10/12/2024     Lab Results   Component Value Date    [id number] 5.383 (H) 10/11/2024    FREET4 0.94 10/11/2024     Impression: Low suspicion for CVA. Suspect TME in setting of hypoNa and suspected PNA.    Plan:  Continue stroke pathway, pending MRI brain  If MRI brain negative, OK to d/c stroke pathway  Pt's son OK with pt receiving ASA but declined Plavix. Will cont ASA 81mg daily for now and d/c Plavix. OK to d/c ASA if MRI brain negative for stroke.  Pt's son would like to hold off on Atorvastatin at this time - reasonable.  Normotensive goal.  Normoglycemia (glucose <180), normothermia.  Continue to monitor on telemetry.  Frequent neuro checks per protocol  PT/OT/ST eval appreciated  Does occasionally feel like fluid is getting stuck in her throat but no swallowing issues per speech. Will defer to primary team for any additional eval.  If MRI brain negative, neurology will sign off with no indication for outpatient neurology follow up. If MRI brain is concerning, neurology team will be back in touch with final recommendations.  We will follow peripherally. Please reach out with any questions or concerns.  B12 deficiency  Lab Results   Component Value Date    [id number] 209 10/11/2024     Recommend goal >400.  Agree with primary team - IM B12 supplementation 1000mcg daily x 5 days, followed by PO 1000mcg daily. F/u PCP.    Low B12 levels can contribute to poor cognitive reserve.    SUBJECTIVE     Patient was seen and examined. No acute events overnight. Mental status appears to be improved this morning. Passed speech eval. Son reports pt closer  to baseline now.    Review of Systems   Constitutional:  Negative for appetite change, chills, diaphoresis and fever.   HENT:  Negative for trouble swallowing.    Eyes:  Negative for photophobia and visual disturbance.   Respiratory:  Negative for shortness of breath.    Cardiovascular:  Negative for chest pain.   Gastrointestinal:  Negative for abdominal pain.   Neurological:  Negative for dizziness, seizures, syncope, facial asymmetry, speech difficulty, weakness, light-headedness, numbness and headaches.   Psychiatric/Behavioral:  Positive for confusion (slight).    All other systems reviewed and are negative.      OBJECTIVE     Patient ID: Ashlee Bermeo is a 79 y.o. female.    Vitals:    10/12/24 1121 10/12/24 1200 10/12/24 1505 10/12/24 1612   BP: 122/50 122/50 (!) 123/49 (!) 114/48   BP Location: Right arm      Pulse: 60  60 60   Resp: 18  17 17   Temp: 98.3 °F (36.8 °C)  (!) 97.1 °F (36.2 °C)    TempSrc: Oral      SpO2: 97%  96% 95%   Weight:       Height:          Temperature:   Temp (24hrs), Av.9 °F (37.2 °C), Min:97.1 °F (36.2 °C), Max:101.2 °F (38.4 °C)    Temperature: (!) 97.1 °F (36.2 °C)      Physical Exam  Vitals and nursing note reviewed.   Constitutional:       General: She is not in acute distress.     Appearance: She is not ill-appearing, toxic-appearing or diaphoretic.   HENT:      Head: Normocephalic and atraumatic.      Mouth/Throat:      Mouth: Mucous membranes are moist.   Eyes:      General:         Right eye: No discharge.         Left eye: No discharge.      Extraocular Movements: Extraocular movements intact.      Pupils: Pupils are equal, round, and reactive to light.   Cardiovascular:      Rate and Rhythm: Normal rate and regular rhythm.   Pulmonary:      Effort: Pulmonary effort is normal. No respiratory distress.   Musculoskeletal:         General: Normal range of motion.      Cervical back: Normal range of motion and neck supple.      Right lower leg: No edema.      Left lower  leg: No edema.   Skin:     General: Skin is warm and dry.   Neurological:      Mental Status: She is alert.      Comments: AAOx3 (did take a while to remember Halloween is coming up). Pleasant, cooperative, appropriately interactive. Slightly [place].  No aphasia or dysarthria noted.  Speech fluent, spontaneous.  PERRLA/EOMI. No pathologic nystagmus on primary or endgaze. VF intact in all 4 temporal quadrants. CN2-12 intact.  Normal muscle tone.  5/5 strength in all 4 ext, b/l, symmetric - deltoids, biceps, triceps, hip flex, knee flex/ext, ankle dorsi/plantarflexion  Sensation intact and symmetric in all 4 ext to fine touch.  FNF B/L intact and symmetric.  Toes downgoing       Psychiatric:         Mood and Affect: Mood normal.         Behavior: Behavior normal.         Thought Content: Thought content normal.         Judgment: Judgment normal.          LABORATORY DATA     Labs:   Results from last 7 days   Lab Units 10/12/24  0349 10/11/24  1238   WBC Thousand/uL 12.50* 9.05   HEMOGLOBIN g/dL 10.4* 10.4*   HEMATOCRIT % 30.4* 30.2*   PLATELETS Thousands/uL 262 321   SEGS PCT % 82*  --    MONO PCT % 8  --    EOS PCT % 0  --       Results from last 7 days   Lab Units 10/12/24  1012 10/12/24  0349 10/11/24  2321   SODIUM mmol/L 131* 131* 131*   POTASSIUM mmol/L 3.8 4.2 4.5   CHLORIDE mmol/L 99 97 96   CO2 mmol/L 26 26 27   BUN mg/dL 24 26* 24   CREATININE mg/dL 1.41* 1.51* 1.49*   CALCIUM mg/dL 8.2* 8.8 8.9     Results from last 7 days   Lab Units 10/12/24  0349   MAGNESIUM mg/dL 1.9          Results from last 7 days   Lab Units 10/11/24  1238   INR  0.93   PTT seconds 20*               IMAGING & DIAGNOSTIC TESTING     Radiology Results: Results Review Statement: I personally reviewed the following image studies in PACS and associated radiology reports: CT head and CT A H/N. My interpretation of the radiology images/reports is: agree with radiology.     VAS VENOUS DUPLEX - LOWER LIMB BILATERAL   Final Result by Adria  Tre Sims MD (10/12 1032)      XR shoulder 2+ vw right   Final Result by Kiarn Li MD (10/12 1007)      No acute osseous abnormality.      Computerized Assisted Algorithm (CAA) may have been used to analyze all applicable images.         Resident: Bart Lund I, the attending radiologist, have reviewed the images and agree with the final report above.      Workstation performed: [id number]         XR chest 1 view portable   Final Result by Fritz Saab MD (10/11 1111)      No acute cardiopulmonary disease.            Workstation performed: [id number]         CTA stroke alert (head/neck)   Final Result by E. Alec Schoenberger, MD (10/11 9105)   No significant stenosis of the cervical carotid or vertebral arteries   No significant intracranial stenosis, large vessel occlusion or aneurysm.            After first attempting to call, findings were reported to Waldo Hoffman   via HIPAA compliant secure electronic messaging at 12:28 p.m. who acknowledged receipt of findings at 12:29 p.m.            Workstation performed: [id number]         CT stroke alert brain   Final Result by E. Alec Schoenberger, MD (10/11 0826)      No acute intracranial abnormality.         Findings were reported to Waldo Hoffman   via HIPAA compliant secure electronic messaging at 12:28 p.m. who acknowledged receipt of findings at 12:29 p.m.      Workstation performed: [id number]         CT chest abdomen pelvis wo contrast    (Results Pending)   MRI Inpatient Order    (Results Pending)   XR knee 4+ vw right injury    (Results Pending)       Other Diagnostic Testing: Telemetry - no cardiac arrhythmia    ACTIVE MEDICATIONS       Current Facility-Administered Medications:     acetaminophen (TYLENOL) rectal suppository 650 mg, Q6H PRN    aspirin chewable tablet 81 mg, Daily    aspirin tablet 325 mg, Once    atenolol (TENORMIN) tablet 25 mg, Daily    ceftriaxone (ROCEPHIN) 1 g/50 mL in dextrose IVPB, Q24H, Last Rate: 1,000 mg  (10/12/24 1702)    clopidogrel (PLAVIX) tablet 300 mg, Once    cyanocobalamin injection 1,000 mcg, Daily **FOLLOWED BY** [START ON 10/16/2024] cyanocobalamin (VITAMIN B-12) tablet 1,000 mcg, Daily    diphenhydrAMINE (BENADRYL) injection 50 mg, Q6H PRN    enoxaparin (LOVENOX) subcutaneous injection 30 mg, Daily    hydrALAZINE (APRESOLINE) injection 5 mg, Q6H PRN    sodium chloride 0.9 % infusion, Continuous, Last Rate: 50 mL/hr (10/12/24 0658)    Prior to Admission medications    Medication Sig Start Date End Date Taking? Authorizing Provider   atenolol (TENORMIN) 25 mg tablet Take 25 mg by mouth daily Patient takes at 2300 at home   Yes Historical Provider, MD         VTE Pharmacologic Prophylaxis: VTE covered by:  enoxaparin, Subcutaneous      VTE Mechanical Prophylaxis: sequential compression device    ======    I have discussed the patient's history, physical exam findings, assessment, and plan in detail with attending, Dr. Hoffman    Thank you for allowing me to participate in the care of your patient, Ashlee Bermeo.    Tamiko Carey, DO  [facility], PGY-3

## 2024-10-12 NOTE — ASSESSMENT & PLAN NOTE
POA with confusion and sodium 126  Similar presentation in 2021 as per pt's son, mentation improved at that time with sodium normalization  H/o of fall about 2.5 weeks ago, pt had mild pain since then and was taking tylenol, denies NSAIDs  Hypovolemic on exam  Serum osmolality normal at 286  Recent Labs     10/11/24  2321 10/12/24  0349 10/12/24  1012   SODIUM 131* 131* 131*     Sodium stabilized and improving    Plan:  BMP every 12 hours  Follow CT C/A/P

## 2024-10-12 NOTE — PLAN OF CARE
Problem: SLP ADULT - SWALLOWING, IMPAIRED  Goal: Advance to least restrictive diet without signs or symptoms of aspiration for planned discharge setting.  See evaluation for individualized goals.  Description:  Patient will:    1.  Safely swallow regular solids and thin/ all liquids without overt signs or symptoms of aspiration or dysphagia.  1 day.  Outcome: Adequate for Discharge     Problem: SLP ADULT - SWALLOWING, IMPAIRED  Goal: Initial SLP swallow eval performed  Outcome: Completed

## 2024-10-12 NOTE — ASSESSMENT & PLAN NOTE
Lab Results   Component Value Date    [id number] 209 10/11/2024     Recommend goal >400.  Agree with primary team - IM B12 supplementation 1000mcg daily x 5 days, followed by PO 1000mcg daily. F/u PCP.    Low B12 levels can contribute to poor cognitive reserve.

## 2024-10-12 NOTE — PLAN OF CARE
Problem: Prexisting or High Potential for Compromised Skin Integrity  Goal: Skin integrity is maintained or improved  Description: INTERVENTIONS:  - Identify patients at risk for skin breakdown  - Assess and monitor skin integrity  - Assess and monitor nutrition and hydration status  - Monitor labs   - Assess for incontinence   - Turn and reposition patient  - Assist with mobility/ambulation  - Relieve pressure over bony prominences  - Avoid friction and shearing  - Provide appropriate hygiene as needed including keeping skin clean and dry  - Evaluate need for skin moisturizer/barrier cream  - Collaborate with interdisciplinary team   - Patient/family teaching  - Consider wound care consult   Outcome: Progressing     Problem: PAIN - ADULT  Goal: Verbalizes/displays adequate comfort level or baseline comfort level  Description: Interventions:  - Encourage patient to monitor pain and request assistance  - Assess pain using appropriate pain scale  - Administer analgesics based on type and severity of pain and evaluate response  - Implement non-pharmacological measures as appropriate and evaluate response  - Consider cultural and social influences on pain and pain management  - Notify physician/advanced practitioner if interventions unsuccessful or patient reports new pain  Outcome: Progressing     Problem: INFECTION - ADULT  Goal: Absence or prevention of progression during hospitalization  Description: INTERVENTIONS:  - Assess and monitor for signs and symptoms of infection  - Monitor lab/diagnostic results  - Monitor all insertion sites, i.e. indwelling lines, tubes, and drains  - Monitor endotracheal if appropriate and nasal secretions for changes in amount and color  - [place] appropriate cooling/warming therapies per order  - Administer medications as ordered  - Instruct and encourage patient and family to use good hand hygiene technique  - Identify and instruct in appropriate isolation precautions for  identified infection/condition  Outcome: Progressing  Goal: Absence of fever/infection during neutropenic period  Description: INTERVENTIONS:  - Monitor WBC    Outcome: Progressing     Problem: SAFETY ADULT  Goal: Patient will remain free of falls  Description: INTERVENTIONS:  - Educate patient/family on patient safety including physical limitations  - Instruct patient to call for assistance with activity   - Consult OT/PT to assist with strengthening/mobility   - Keep Call bell within reach  - Keep bed low and locked with side rails adjusted as appropriate  - Keep care items and personal belongings within reach  - Initiate and maintain comfort rounds  - Make Fall Risk Sign visible to staff  - Offer Toileting every  Hours, in advance of need  - Initiate/Maintain alarm  - Obtain necessary fall risk management equipment:   - Apply yellow socks and bracelet for high fall risk patients  - Consider moving patient to room near nurses station  Outcome: Progressing  Goal: Maintain or return to baseline ADL function  Description: INTERVENTIONS:  -  Assess patient's ability to carry out ADLs; assess patient's baseline for ADL function and identify physical deficits which impact ability to perform ADLs (bathing, care of mouth/teeth, toileting, grooming, dressing, etc.)  - Assess/evaluate cause of self-care deficits   - Assess range of motion  - Assess patient's mobility; develop plan if impaired  - Assess patient's need for assistive devices and provide as appropriate  - Encourage maximum independence but intervene and supervise when necessary  - Involve family in performance of ADLs  - Assess for home care needs following discharge   - Consider OT consult to assist with ADL evaluation and planning for discharge  - Provide patient education as appropriate  Outcome: Progressing  Goal: Maintains/Returns to pre admission functional level  Description: INTERVENTIONS:  - Perform AM-PAC 6 Click Basic Mobility/ Daily Activity  assessment daily.  - Set and communicate daily mobility goal to care team and patient/family/caregiver.   - Collaborate with rehabilitation services on mobility goals if consulted  - Perform Range of Motion  times a day.  - Reposition patient every  hours.  - Dangle patient  times a day  - Stand patient  times a day  - Ambulate patient  times a day  - Out of bed to chair  times a day   - Out of bed for meals  times a day  - Out of bed for toileting  - Record patient progress and toleration of activity level   Outcome: Progressing     Problem: DISCHARGE PLANNING  Goal: Discharge to home or other facility with appropriate resources  Description: INTERVENTIONS:  - Identify barriers to discharge w/patient and caregiver  - Arrange for needed discharge resources and transportation as appropriate  - Identify discharge learning needs (meds, wound care, etc.)  - Arrange for interpretive services to assist at discharge as needed  - Refer to Case Management Department for coordinating discharge planning if the patient needs post-hospital services based on physician/advanced practitioner order or complex needs related to functional status, cognitive ability, or social support system  Outcome: Progressing     Problem: Knowledge Deficit  Goal: Patient/family/caregiver demonstrates understanding of disease process, treatment plan, medications, and discharge instructions  Description: Complete learning assessment and assess knowledge base.  Interventions:  - Provide teaching at level of understanding  - Provide teaching via preferred learning methods  Outcome: Progressing

## 2024-10-12 NOTE — ASSESSMENT & PLAN NOTE
Lab Results   Component Value Date    EGFR 32 10/12/2024    EGFR 32 10/11/2024    EGFR 35 10/11/2024    CREATININE 1.51 (H) 10/12/2024    CREATININE 1.49 (H) 10/11/2024    CREATININE 1.39 (H) 10/11/2024   Avoid hypotension and nephrotoxic medications

## 2024-10-12 NOTE — ASSESSMENT & PLAN NOTE
"Pt's son reported fall about 2.5 weeks ago on her \"knees and hands\", managed with tylenol  Pt was complaining of R shoulder and right lateral chest wall pain (possibly ribs)  Xray chest was unremarkable  Obtain Right shoulder Xray  Right knee x-ray ordered  Tylenol for pain prn  Voltaren gel and lidocaine patch prn    "

## 2024-10-12 NOTE — ASSESSMENT & PLAN NOTE
Leukocytosis of 12.5 with neutrophils and segmented  Unknown source  Chest x-ray unremarkable, UA normal  Continue empiric IV Rocephin  Monitor vital signs and CBC

## 2024-10-12 NOTE — PLAN OF CARE
Problem: Prexisting or High Potential for Compromised Skin Integrity  Goal: Skin integrity is maintained or improved  Description: INTERVENTIONS:  - Identify patients at risk for skin breakdown  - Assess and monitor skin integrity  - Assess and monitor nutrition and hydration status  - Monitor labs   - Assess for incontinence   - Turn and reposition patient  - Assist with mobility/ambulation  - Relieve pressure over bony prominences  - Avoid friction and shearing  - Provide appropriate hygiene as needed including keeping skin clean and dry  - Evaluate need for skin moisturizer/barrier cream  - Collaborate with interdisciplinary team   - Patient/family teaching  - Consider wound care consult   10/12/2024 0259 by Gena Randall RN  Outcome: Progressing  10/12/2024 0258 by Gena Randall RN  Outcome: Progressing

## 2024-10-12 NOTE — ASSESSMENT & PLAN NOTE
POA with HGB 10.4, normocytic, RDW within normal range  Reported black stools by pt's son  H/o of CKD 3  LDH normal, folate normal, iron panel within normal limits  Follow daily CBC, transfuse if HGB <7  Recommend GI follow up in o/p settings

## 2024-10-13 LAB
ANION GAP SERPL CALCULATED.3IONS-SCNC: 5 MMOL/L (ref 4–13)
ANION GAP SERPL CALCULATED.3IONS-SCNC: 7 MMOL/L (ref 4–13)
BASOPHILS # BLD AUTO: 0.02 THOUSANDS/ΜL (ref 0–0.1)
BASOPHILS NFR BLD AUTO: 0 % (ref 0–1)
BUN SERPL-MCNC: 27 MG/DL (ref 5–25)
BUN SERPL-MCNC: 28 MG/DL (ref 5–25)
CALCIUM SERPL-MCNC: 8.4 MG/DL (ref 8.4–10.2)
CALCIUM SERPL-MCNC: 8.6 MG/DL (ref 8.4–10.2)
CHLORIDE SERPL-SCNC: 102 MMOL/L (ref 96–108)
CHLORIDE SERPL-SCNC: 102 MMOL/L (ref 96–108)
CO2 SERPL-SCNC: 26 MMOL/L (ref 21–32)
CO2 SERPL-SCNC: 27 MMOL/L (ref 21–32)
CREAT SERPL-MCNC: 1.21 MG/DL (ref 0.6–1.3)
CREAT SERPL-MCNC: 1.24 MG/DL (ref 0.6–1.3)
EOSINOPHIL # BLD AUTO: 0.16 THOUSAND/ΜL (ref 0–0.61)
EOSINOPHIL NFR BLD AUTO: 2 % (ref 0–6)
ERYTHROCYTE [DISTWIDTH] IN BLOOD BY AUTOMATED COUNT: 13.2 % (ref 11.6–15.1)
GFR SERPL CREATININE-BSD FRML MDRD: 41 ML/MIN/1.73SQ M
GFR SERPL CREATININE-BSD FRML MDRD: 42 ML/MIN/1.73SQ M
GLUCOSE SERPL-MCNC: 120 MG/DL (ref 65–140)
GLUCOSE SERPL-MCNC: 95 MG/DL (ref 65–140)
HCT VFR BLD AUTO: 27.5 % (ref 34.8–46.1)
HGB BLD-MCNC: 9.2 G/DL (ref 11.5–15.4)
IMM GRANULOCYTES # BLD AUTO: 0.02 THOUSAND/UL (ref 0–0.2)
IMM GRANULOCYTES NFR BLD AUTO: 0 % (ref 0–2)
LYMPHOCYTES # BLD AUTO: 1.02 THOUSANDS/ΜL (ref 0.6–4.47)
LYMPHOCYTES NFR BLD AUTO: 13 % (ref 14–44)
MCH RBC QN AUTO: 28.4 PG (ref 26.8–34.3)
MCHC RBC AUTO-ENTMCNC: 33.5 G/DL (ref 31.4–37.4)
MCV RBC AUTO: 85 FL (ref 82–98)
MONOCYTES # BLD AUTO: 0.56 THOUSAND/ΜL (ref 0.17–1.22)
MONOCYTES NFR BLD AUTO: 7 % (ref 4–12)
NEUTROPHILS # BLD AUTO: 5.81 THOUSANDS/ΜL (ref 1.85–7.62)
NEUTS SEG NFR BLD AUTO: 78 % (ref 43–75)
NRBC BLD AUTO-RTO: 0 /100 WBCS
PLATELET # BLD AUTO: 212 THOUSANDS/UL (ref 149–390)
PMV BLD AUTO: 8.6 FL (ref 8.9–12.7)
POTASSIUM SERPL-SCNC: 4.3 MMOL/L (ref 3.5–5.3)
POTASSIUM SERPL-SCNC: 4.4 MMOL/L (ref 3.5–5.3)
PROCALCITONIN SERPL-MCNC: 0.17 NG/ML
RBC # BLD AUTO: 3.24 MILLION/UL (ref 3.81–5.12)
SODIUM SERPL-SCNC: 133 MMOL/L (ref 135–147)
SODIUM SERPL-SCNC: 136 MMOL/L (ref 135–147)
WBC # BLD AUTO: 7.59 THOUSAND/UL (ref 4.31–10.16)

## 2024-10-13 PROCEDURE — 85025 COMPLETE CBC W/AUTO DIFF WBC: CPT | Performed by: STUDENT IN AN ORGANIZED HEALTH CARE EDUCATION/TRAINING PROGRAM

## 2024-10-13 PROCEDURE — 80048 BASIC METABOLIC PNL TOTAL CA: CPT

## 2024-10-13 PROCEDURE — 84145 PROCALCITONIN (PCT): CPT | Performed by: STUDENT IN AN ORGANIZED HEALTH CARE EDUCATION/TRAINING PROGRAM

## 2024-10-13 RX ORDER — HEPARIN SODIUM 5000 [USP'U]/ML
5000 INJECTION, SOLUTION INTRAVENOUS; SUBCUTANEOUS EVERY 8 HOURS SCHEDULED
Status: DISCONTINUED | OUTPATIENT
Start: 2024-10-13 | End: 2024-10-16 | Stop reason: HOSPADM

## 2024-10-13 RX ORDER — SODIUM CHLORIDE 9 MG/ML
50 INJECTION, SOLUTION INTRAVENOUS CONTINUOUS
Status: DISCONTINUED | OUTPATIENT
Start: 2024-10-13 | End: 2024-10-14

## 2024-10-13 RX ADMIN — SODIUM CHLORIDE 50 ML/HR: 0.9 INJECTION, SOLUTION INTRAVENOUS at 22:50

## 2024-10-13 RX ADMIN — CYANOCOBALAMIN 1000 MCG: 1000 INJECTION, SOLUTION INTRAMUSCULAR; SUBCUTANEOUS at 08:59

## 2024-10-13 RX ADMIN — ATENOLOL 25 MG: 25 TABLET ORAL at 08:55

## 2024-10-13 RX ADMIN — CEFTRIAXONE 1000 MG: 2 INJECTION, POWDER, FOR SOLUTION INTRAMUSCULAR; INTRAVENOUS at 17:47

## 2024-10-13 RX ADMIN — SODIUM CHLORIDE 50 ML/HR: 0.9 INJECTION, SOLUTION INTRAVENOUS at 02:59

## 2024-10-13 RX ADMIN — ASPIRIN 81 MG 81 MG: 81 TABLET ORAL at 08:55

## 2024-10-13 NOTE — ASSESSMENT & PLAN NOTE
POA with confusion, delirium starting this am, last well known yesterday as per son  Denies any sx of URI, c/o of dysuria, chest pain, fever, chills or any other complains  Pt's so admits mild cough yesterday with no fever  POA patient was disoriented, delirious, restless and agitated, unable to answer questions or to provide history.   TSH elevated at 5.3, but T4 normal.  Normal UA  Normal coma panel, normal ammonia  CTH and CTA h&n showed no acute bleeding or significant occlusion  S/p  cc bolus in ED, load dose of asa 325 and plavix 300, ativan 0.5 for agitation  Mentation significantly improved, AAO x 2.  Mental status baseline confirmed at bedside with son  Venous complex lower extremities negative for acute or chronic DVT bilaterally  Altered mental status likely due to hyponatremia, however stroke, infectious process will be ruled out    Results for orders placed during the hospital encounter of 10/11/24    Echo complete w/ contrast if indicated    Interpretation Summary    Left Ventricle: Left ventricular cavity size is normal. Wall thickness is normal. The left ventricular ejection fraction is 65%. Systolic function is normal. Wall motion is normal. Diastolic function is mildly abnormal, consistent with grade I (abnormal) relaxation.    Right Ventricle: Right ventricular cavity size is normal. Systolic function is normal.    Left Atrium: The atrium is mildly dilated.    Mitral Valve: There is mild annular calcification. There is mild regurgitation.    Tricuspid Valve: There is mild regurgitation. The right ventricular systolic pressure is normal. The estimated right ventricular systolic pressure is 27.00 mmHg.    Pulmonic Valve: There is mild regurgitation.    Plan:  Admit for stroke pathway  Management of hyponatremia as above  Neurology consulted, recs appreciated  EEG ordered  Follow-up MRI brain  Follow daily BMP, replete electrolytes as needed  PT/OT eval   Fall precautions  Follow CT CAP w/o  contrast

## 2024-10-13 NOTE — ASSESSMENT & PLAN NOTE
Lab Results   Component Value Date    [id number] 209 10/11/2024     Vitamin B12 levels low at 2 9, patient is a candidate for IM B12 supplementation  1000 mcg daily x 5 days followed by 1000 mcg p.o. daily  Follow-up with PCP as outpatient

## 2024-10-13 NOTE — ASSESSMENT & PLAN NOTE
Creatinine baseline ~ 1.1-.13   Increased creatinine to 1.51 -> 1.41  Likely prerenal in setting of hypovolemia on exam and poor p.o. intake  Continue IV normal saline 50 cc/h x 24 hours

## 2024-10-13 NOTE — ASSESSMENT & PLAN NOTE
"Pt's son reported fall about 2.5 weeks ago on her \"knees and hands\", managed with tylenol  Pt was complaining of R shoulder and right lateral chest wall pain (possibly ribs)  Xray chest was unremarkable  Obtain Right shoulder Xray  Right knee x-ray ordered  Tylenol for pain prn  Voltaren gel and lidocaine patch prn  Orthostatic vitals prior to discharge    "

## 2024-10-13 NOTE — PROGRESS NOTES
Progress Note - Hospitalist   Name: Ashlee Bermeo 79 y.o. female I MRN: [id number]  Unit/Bed#: S -01 I Date of Admission: 10/11/2024   Date of Service: 10/13/2024 I Hospital Day: 2    Assessment & Plan  AMS (altered mental status)  POA with confusion, delirium starting this am, last well known yesterday as per son  Denies any sx of URI, c/o of dysuria, chest pain, fever, chills or any other complains  Pt's so admits mild cough yesterday with no fever  POA patient was disoriented, delirious, restless and agitated, unable to answer questions or to provide history.   TSH elevated at 5.3, but T4 normal.  Normal UA  Normal coma panel, normal ammonia  CTH and CTA h&n showed no acute bleeding or significant occlusion  S/p  cc bolus in ED, load dose of asa 325 and plavix 300, ativan 0.5 for agitation  Mentation significantly improved, AAO x 2.  Mental status baseline confirmed at bedside with son  Venous complex lower extremities negative for acute or chronic DVT bilaterally  Altered mental status likely due to hyponatremia, however stroke, infectious process will be ruled out    Results for orders placed during the hospital encounter of 10/11/24    Echo complete w/ contrast if indicated    Interpretation Summary    Left Ventricle: Left ventricular cavity size is normal. Wall thickness is normal. The left ventricular ejection fraction is 65%. Systolic function is normal. Wall motion is normal. Diastolic function is mildly abnormal, consistent with grade I (abnormal) relaxation.    Right Ventricle: Right ventricular cavity size is normal. Systolic function is normal.    Left Atrium: The atrium is mildly dilated.    Mitral Valve: There is mild annular calcification. There is mild regurgitation.    Tricuspid Valve: There is mild regurgitation. The right ventricular systolic pressure is normal. The estimated right ventricular systolic pressure is 27.00 mmHg.    Pulmonic Valve: There is mild  regurgitation.    Plan:  Admit for stroke pathway  Management of hyponatremia as above  Neurology consulted, recs appreciated  EEG ordered  Follow-up MRI brain  Follow daily BMP, replete electrolytes as needed  PT/OT eval   Fall precautions  Follow CT CAP w/o contrast    Hyponatremia  POA with confusion and sodium 126  Similar presentation in 2021 as per pt's son, mentation improved at that time with sodium normalization  H/o of fall about 2.5 weeks ago, pt had mild pain since then and was taking tylenol, denies NSAIDs  Hypovolemic on exam  Serum osmolality normal at 286  Recent Labs     10/12/24  1012 10/12/24  2020 10/13/24  0806   SODIUM 131* 130* 133*     Sodium stabilized and improving    Plan:  BMP every 12 hours  Follow CT C/A/P    DEMI (acute kidney injury) (HCC)  Creatinine baseline ~ 1.1-.13   Increased creatinine to 1.51 -> 1.41  Likely prerenal in setting of hypovolemia on exam and poor p.o. intake  Continue IV normal saline 50 cc/h x 24 hours  Leukocytosis  Leukocytosis of 12.5 with neutrophils and segmented  Unknown source  Chest x-ray unremarkable, UA normal  Continue empiric IV Rocephin  Monitor vital signs and CBC  Anemia  POA with HGB 10.4, normocytic, RDW within normal range  Reported black stools by pt's son  H/o of CKD 3  LDH normal, folate normal, iron panel within normal limits  Follow daily CBC, transfuse if HGB <7  Recommend GI follow up in o/p settings    HTN (hypertension)  History confirmed with PCP office  PTA meds atenolol 25 mg daily  Pt was hypertensive on admission with , improved without intervention to , possibly agitation contributing, allow permissive hypertension for the next 24 h  Restart atenolol if stroke rulled out and pt is able to take po meds  HLD (hyperlipidemia)  As per PCP office pt is not on statin  Follow lipid panel  Hypothyroid  Not taking levothyroxine as per PCP office  TSH is slightly elevated to 5.3  Normal free T4  CKD (chronic kidney  "disease)  Lab Results   Component Value Date    EGFR 41 10/13/2024    EGFR 34 10/12/2024    EGFR 35 10/12/2024    CREATININE 1.24 10/13/2024    CREATININE 1.42 (H) 10/12/2024    CREATININE 1.41 (H) 10/12/2024   Avoid hypotension and nephrotoxic medications  Continue to monitor daily renal function  Continue with gentle IV fluid hydration with normal saline 50 cc/h x 24 hours  H/O fall  Pt's son reported fall about 2.5 weeks ago on her \"knees and hands\", managed with tylenol  Pt was complaining of R shoulder and right lateral chest wall pain (possibly ribs)  Xray chest was unremarkable  Obtain Right shoulder Xray  Right knee x-ray ordered  Tylenol for pain prn  Voltaren gel and lidocaine patch prn  Orthostatic vitals prior to discharge    Cough  History of cough x 1 day, afebrile  Chest x-ray unremarkable.  COVID/flu/RSV negative  Procalcitonin negative  Leukocytosis of 12.5  Continue empiric treatment with Rocephin  Follow CT C/A/P    B12 deficiency  Lab Results   Component Value Date    [id number] 209 10/11/2024     Vitamin B12 levels low at 2 9, patient is a candidate for IM B12 supplementation  1000 mcg daily x 5 days followed by 1000 mcg p.o. daily  Follow-up with PCP as outpatient    VTE Pharmacologic Prophylaxis: VTE Score: 8 High Risk (Score >/= 5) - Pharmacological DVT Prophylaxis Ordered: enoxaparin (Lovenox). Sequential Compression Devices Ordered.    Mobility:   Basic Mobility Inpatient Raw Score: 9  JH-HLM Goal: 3: Sit at edge of bed  JH-HLM Achieved: 7: Walk 25 feet or more  JH-HLM Goal achieved. Continue to encourage appropriate mobility.    Patient Centered Rounds: I performed bedside rounds with nursing staff today.   Discussions with Specialists or Other Care Team Provider: Attending    Education and Discussions with Family / Patient: Updated  (son) at bedside.    Current Length of Stay: 2 day(s)  Current Patient Status: Inpatient   Certification Statement: The patient will continue to " require additional inpatient hospital stay due to Ongoing evaluation and imaging studies   Discharge Plan: Anticipate discharge in 24-48 hrs to home.    Code Status: Level 1 - Full Code    Subjective   Patient seen and evaluated at bedside with son present.  Patient stated she was feeling overall improved, did endorse some mild nausea which she states was possibly being helped by food.  Plan of care concerning CT chest abdomen pelvis and MRI brain outlined to patient and son, both verbalized understanding at this time.  Plan of care concerning orthostatic vitals outlined additionally.  All questions answered at this time    Objective :  Temp:  [97.1 °F (36.2 °C)-98.8 °F (37.1 °C)] 98 °F (36.7 °C)  HR:  [60-67] 67  BP: (114-185)/(48-84) 185/84  Resp:  [17-18] 17  SpO2:  [82 %-100 %] 95 %  O2 Device: None (Room air)    Body mass index is 23.91 kg/m².     Input and Output Summary (last 24 hours):     Intake/Output Summary (Last 24 hours) at 10/13/2024 1058  Last data filed at 10/13/2024 0900  Gross per 24 hour   Intake 900 ml   Output 1900 ml   Net -1000 ml       Physical Exam  Constitutional:       General: She is not in acute distress.     Appearance: Normal appearance.   HENT:      Head: Normocephalic and atraumatic.      Right Ear: External ear normal.      Left Ear: External ear normal.      Nose: Nose normal.      Mouth/Throat:      Mouth: Mucous membranes are moist.      Pharynx: Oropharynx is clear.   Eyes:      General: No scleral icterus.     Extraocular Movements: Extraocular movements intact.      Conjunctiva/sclera: Conjunctivae normal.   Cardiovascular:      Rate and Rhythm: Normal rate and regular rhythm.      Pulses: Normal pulses.      Heart sounds: Normal heart sounds. No murmur heard.     No friction rub. No gallop.   Pulmonary:      Effort: Pulmonary effort is normal.      Breath sounds: Normal breath sounds. No rales.   Abdominal:      General: Bowel sounds are normal.      Tenderness: There is no  abdominal tenderness. There is no guarding.      Hernia: No hernia is present.   Musculoskeletal:      Right knee: Ecchymosis present.      Left knee: Normal.      Right lower leg: No edema.      Left lower leg: No edema.   Skin:     General: Skin is warm.      Capillary Refill: Capillary refill takes less than 2 seconds.   Neurological:      Mental Status: She is alert and oriented to person, place, and time.           Lines/Drains:        Telemetry:  Telemetry Orders (From admission, onward)               24 Hour Telemetry Monitoring  Continuous x 24 Hours (Telem)        Question:  Reason for 24 Hour Telemetry  Answer:  TIA/Suspected CVA/ Confirmed CVA                     Telemetry Reviewed: Normal Sinus Rhythm  Indication for Continued Telemetry Use: No indication for continued use. Will discontinue.                Lab Results: I have reviewed the following results:   Results from last 7 days   Lab Units 10/13/24  0806   WBC Thousand/uL 7.59   HEMOGLOBIN g/dL 9.2*   HEMATOCRIT % 27.5*   PLATELETS Thousands/uL 212   SEGS PCT % 78*   LYMPHO PCT % 13*   MONO PCT % 7   EOS PCT % 2     Results from last 7 days   Lab Units 10/13/24  0806   SODIUM mmol/L 133*   POTASSIUM mmol/L 4.4   CHLORIDE mmol/L 102   CO2 mmol/L 26   BUN mg/dL 28*   CREATININE mg/dL 1.24   ANION GAP mmol/L 5   CALCIUM mg/dL 8.4   GLUCOSE RANDOM mg/dL 95     Results from last 7 days   Lab Units 10/11/24  1238   INR  0.93     Results from last 7 days   Lab Units 10/11/24  1215   POC GLUCOSE mg/dl 103     Results from last 7 days   Lab Units 10/12/24  0349   HEMOGLOBIN A1C % 5.6     Results from last 7 days   Lab Units 10/13/24  0806 10/11/24  1238   PROCALCITONIN ng/ml 0.17 <0.05       Recent Cultures (last 7 days):   Results from last 7 days   Lab Units 10/11/24  2009   BLOOD CULTURE  No Growth at 24 hrs.  No Growth at 24 hrs.       Imaging Results Review: No pertinent imaging studies reviewed.  Other Study Results Review: EKG was reviewed.     Last  24 Hours Medication List:     Current Facility-Administered Medications:     acetaminophen (TYLENOL) rectal suppository 650 mg, Q6H PRN    aspirin chewable tablet 81 mg, Daily    aspirin tablet 325 mg, Once    atenolol (TENORMIN) tablet 25 mg, Daily    ceftriaxone (ROCEPHIN) 1 g/50 mL in dextrose IVPB, Q24H, Last Rate: 1,000 mg (10/12/24 1702)    clopidogrel (PLAVIX) tablet 300 mg, Once    cyanocobalamin injection 1,000 mcg, Daily **FOLLOWED BY** [START ON 10/16/2024] cyanocobalamin (VITAMIN B-12) tablet 1,000 mcg, Daily    diphenhydrAMINE (BENADRYL) injection 50 mg, Q6H PRN    enoxaparin (LOVENOX) subcutaneous injection 30 mg, Daily    hydrALAZINE (APRESOLINE) injection 5 mg, Q6H PRN    sodium chloride 0.9 % infusion, Continuous    trimethobenzamide (TIGAN) IM injection 200 mg, Q6H PRN    Administrative Statements   Today, Patient Was Seen By: Bogdan Pineda MD      **Please Note: This note may have been constructed using a voice recognition system.**

## 2024-10-13 NOTE — ASSESSMENT & PLAN NOTE
POA with confusion and sodium 126  Similar presentation in 2021 as per pt's son, mentation improved at that time with sodium normalization  H/o of fall about 2.5 weeks ago, pt had mild pain since then and was taking tylenol, denies NSAIDs  Hypovolemic on exam  Serum osmolality normal at 286  Recent Labs     10/12/24  1012 10/12/24  2020 10/13/24  0806   SODIUM 131* 130* 133*     Sodium stabilized and improving    Plan:  BMP every 12 hours  Follow CT C/A/P

## 2024-10-13 NOTE — ASSESSMENT & PLAN NOTE
Lab Results   Component Value Date    EGFR 41 10/13/2024    EGFR 34 10/12/2024    EGFR 35 10/12/2024    CREATININE 1.24 10/13/2024    CREATININE 1.42 (H) 10/12/2024    CREATININE 1.41 (H) 10/12/2024   Avoid hypotension and nephrotoxic medications  Continue to monitor daily renal function  Continue with gentle IV fluid hydration with normal saline 50 cc/h x 24 hours

## 2024-10-13 NOTE — PLAN OF CARE
Problem: Prexisting or High Potential for Compromised Skin Integrity  Goal: Skin integrity is maintained or improved  Description: INTERVENTIONS:  - Identify patients at risk for skin breakdown  - Assess and monitor skin integrity  - Assess and monitor nutrition and hydration status  - Monitor labs   - Assess for incontinence   - Turn and reposition patient  - Assist with mobility/ambulation  - Relieve pressure over bony prominences  - Avoid friction and shearing  - Provide appropriate hygiene as needed including keeping skin clean and dry  - Evaluate need for skin moisturizer/barrier cream  - Collaborate with interdisciplinary team   - Patient/family teaching  - Consider wound care consult   Outcome: Progressing     Problem: PAIN - ADULT  Goal: Verbalizes/displays adequate comfort level or baseline comfort level  Description: Interventions:  - Encourage patient to monitor pain and request assistance  - Assess pain using appropriate pain scale  - Administer analgesics based on type and severity of pain and evaluate response  - Implement non-pharmacological measures as appropriate and evaluate response  - Consider cultural and social influences on pain and pain management  - Notify physician/advanced practitioner if interventions unsuccessful or patient reports new pain  Outcome: Progressing     Problem: INFECTION - ADULT  Goal: Absence or prevention of progression during hospitalization  Description: INTERVENTIONS:  - Assess and monitor for signs and symptoms of infection  - Monitor lab/diagnostic results  - Monitor all insertion sites, i.e. indwelling lines, tubes, and drains  - Monitor endotracheal if appropriate and nasal secretions for changes in amount and color  - Cassandra appropriate cooling/warming therapies per order  - Administer medications as ordered  - Instruct and encourage patient and family to use good hand hygiene technique  - Identify and instruct in appropriate isolation precautions for  identified infection/condition  Outcome: Progressing     Problem: SAFETY ADULT  Goal: Patient will remain free of falls  Description: INTERVENTIONS:  - Educate patient/family on patient safety including physical limitations  - Instruct patient to call for assistance with activity   - Consult OT/PT to assist with strengthening/mobility   - Keep Call bell within reach  - Keep bed low and locked with side rails adjusted as appropriate  - Keep care items and personal belongings within reach  - Initiate and maintain comfort rounds  - Make Fall Risk Sign visible to staff  - Offer Toileting every 2 Hours, in advance of need  - Initiate/Maintain bed alarm  - Obtain necessary fall risk management equipment  - Apply yellow socks and bracelet for high fall risk patients  - Consider moving patient to room near nurses station  Outcome: Progressing

## 2024-10-14 ENCOUNTER — APPOINTMENT (INPATIENT)
Dept: NEUROLOGY | Facility: HOSPITAL | Age: 79
DRG: 640 | End: 2024-10-14
Payer: MEDICARE

## 2024-10-14 LAB
ANION GAP SERPL CALCULATED.3IONS-SCNC: 12 MMOL/L (ref 4–13)
BUN SERPL-MCNC: 24 MG/DL (ref 5–25)
CALCIUM SERPL-MCNC: 8.6 MG/DL (ref 8.4–10.2)
CHLORIDE SERPL-SCNC: 105 MMOL/L (ref 96–108)
CO2 SERPL-SCNC: 19 MMOL/L (ref 21–32)
CREAT SERPL-MCNC: 1.15 MG/DL (ref 0.6–1.3)
ERYTHROCYTE [DISTWIDTH] IN BLOOD BY AUTOMATED COUNT: 13 % (ref 11.6–15.1)
GFR SERPL CREATININE-BSD FRML MDRD: 45 ML/MIN/1.73SQ M
GLUCOSE SERPL-MCNC: 123 MG/DL (ref 65–140)
HCT VFR BLD AUTO: 26.2 % (ref 34.8–46.1)
HGB BLD-MCNC: 9.5 G/DL (ref 11.5–15.4)
MAGNESIUM SERPL-MCNC: 2.1 MG/DL (ref 1.9–2.7)
MCH RBC QN AUTO: 31.7 PG (ref 26.8–34.3)
MCHC RBC AUTO-ENTMCNC: 36.3 G/DL (ref 31.4–37.4)
MCV RBC AUTO: 87 FL (ref 82–98)
PLATELET # BLD AUTO: 237 THOUSANDS/UL (ref 149–390)
PMV BLD AUTO: 8.7 FL (ref 8.9–12.7)
POTASSIUM SERPL-SCNC: 4.1 MMOL/L (ref 3.5–5.3)
RBC # BLD AUTO: 3 MILLION/UL (ref 3.81–5.12)
SODIUM SERPL-SCNC: 136 MMOL/L (ref 135–147)
WBC # BLD AUTO: 7.99 THOUSAND/UL (ref 4.31–10.16)

## 2024-10-14 PROCEDURE — 85027 COMPLETE CBC AUTOMATED: CPT

## 2024-10-14 PROCEDURE — 83735 ASSAY OF MAGNESIUM: CPT

## 2024-10-14 PROCEDURE — 80048 BASIC METABOLIC PNL TOTAL CA: CPT

## 2024-10-14 RX ORDER — HYDROCHLOROTHIAZIDE 25 MG/1
25 TABLET ORAL DAILY
Status: DISCONTINUED | OUTPATIENT
Start: 2024-10-14 | End: 2024-10-14

## 2024-10-14 RX ORDER — HYDROCHLOROTHIAZIDE 12.5 MG/1
12.5 TABLET ORAL DAILY
Status: DISCONTINUED | OUTPATIENT
Start: 2024-10-14 | End: 2024-10-15

## 2024-10-14 RX ORDER — POLYETHYLENE GLYCOL 3350 17 G/17G
17 POWDER, FOR SOLUTION ORAL DAILY
Status: DISCONTINUED | OUTPATIENT
Start: 2024-10-15 | End: 2024-10-16 | Stop reason: HOSPADM

## 2024-10-14 RX ORDER — BISACODYL 5 MG/1
5 TABLET, DELAYED RELEASE ORAL
Status: DISCONTINUED | OUTPATIENT
Start: 2024-10-14 | End: 2024-10-16 | Stop reason: HOSPADM

## 2024-10-14 RX ORDER — BISACODYL 10 MG
10 SUPPOSITORY, RECTAL RECTAL ONCE
Status: COMPLETED | OUTPATIENT
Start: 2024-10-14 | End: 2024-10-14

## 2024-10-14 RX ORDER — HYDRALAZINE HYDROCHLORIDE 20 MG/ML
5 INJECTION INTRAMUSCULAR; INTRAVENOUS EVERY 6 HOURS PRN
Status: DISCONTINUED | OUTPATIENT
Start: 2024-10-14 | End: 2024-10-16 | Stop reason: HOSPADM

## 2024-10-14 RX ADMIN — HYDROCHLOROTHIAZIDE 12.5 MG: 12.5 TABLET ORAL at 21:16

## 2024-10-14 RX ADMIN — ATENOLOL 25 MG: 25 TABLET ORAL at 08:46

## 2024-10-14 RX ADMIN — BISACODYL 10 MG: 10 SUPPOSITORY RECTAL at 22:30

## 2024-10-14 RX ADMIN — BISACODYL 5 MG: 5 TABLET, COATED ORAL at 21:16

## 2024-10-14 RX ADMIN — CYANOCOBALAMIN 1000 MCG: 1000 INJECTION, SOLUTION INTRAMUSCULAR; SUBCUTANEOUS at 08:46

## 2024-10-14 RX ADMIN — HYDRALAZINE HYDROCHLORIDE 5 MG: 20 INJECTION INTRAMUSCULAR; INTRAVENOUS at 11:13

## 2024-10-14 NOTE — OCCUPATIONAL THERAPY NOTE
Occupational Therapy Cancellation Note        Patient Name: Ashlee Bermeo  Today's Date: 10/14/2024           10/14/24 0929   Note Type   Note type Cancelled Session   Cancel Reasons Medical status   Additional Comments Attempted to see pt for OT evaluation this AM - however BP elevated. Returned s/p RN providing medications. BP continues to be 206/88. Will hold OT evaluation at this time due to elevated BP. Will continue to follow and see as appropriate/able     Rebecca Mcintosh MS, OTR/L

## 2024-10-14 NOTE — ASSESSMENT & PLAN NOTE
Lab Results   Component Value Date    [id number] 209 10/11/2024     Vitamin B12 levels low at 2 9, patient is a candidate for IM B12 supplementation  1000 mcg daily x 5 days followed by 1000 mcg p.o. daily  Follow-up with PCP as outpatient

## 2024-10-14 NOTE — ASSESSMENT & PLAN NOTE
Lab Results   Component Value Date    EGFR 42 10/13/2024    EGFR 41 10/13/2024    EGFR 34 10/12/2024    CREATININE 1.21 10/13/2024    CREATININE 1.24 10/13/2024    CREATININE 1.42 (H) 10/12/2024   Avoid hypotension and nephrotoxic medications  Continue to monitor daily renal function  Continue with gentle IV fluid hydration with normal saline 50 cc/h x 24 hours

## 2024-10-14 NOTE — ASSESSMENT & PLAN NOTE
POA with confusion, delirium starting this am, last well known yesterday as per son  Denies any sx of URI, c/o of dysuria, chest pain, fever, chills or any other complains  Pt's so admits mild cough yesterday with no fever  POA patient was disoriented, delirious, restless and agitated, unable to answer questions or to provide history.   TSH elevated at 5.3, but T4 normal.  Normal UA  Normal coma panel, normal ammonia  CTH and CTA h&n showed no acute bleeding or significant occlusion  S/p  cc bolus in ED, load dose of asa 325 and plavix 300, ativan 0.5 for agitation  Mentation significantly improved, AAO x 2.  Mental status baseline confirmed at bedside with son  Venous complex lower extremities negative for acute or chronic DVT bilaterally  Echo LVEF 65%, grade I (abnormal) relaxation  No concerns for stroke or TIA.  Per neurology, encephalopathy likely due to TME versus seizure activity due to severe hyponatremia  Currently back to baseline Aox3    Plan:  Neurology sign off, no further inpatient recs  Follow daily BMP, replete electrolytes as needed  PT/OT eval   Fall precautions

## 2024-10-14 NOTE — ASSESSMENT & PLAN NOTE
POA with confusion and sodium 126  Similar presentation in 2021 as per pt's son, mentation improved at that time with sodium normalization  H/o of fall about 2.5 weeks ago, pt had mild pain since then and was taking tylenol, denies NSAIDs  Hypovolemic on exam  Serum osmolality normal at 286  Recent Labs     10/12/24  2020 10/13/24  0806 10/13/24  2038   SODIUM 130* 133* 136     Sodium stabilized and improving    Plan:  Monitor BMP

## 2024-10-14 NOTE — ASSESSMENT & PLAN NOTE
History of cough x 1 day, afebrile  Chest x-ray unremarkable.  COVID/flu/RSV negative  Procalcitonin negative  Leukocytosis of 12.5, however normalized  CT C/A/P showed no acute findings or evidence for malignancy   Treated empirically with Rocephin x3  Monitor off antibiotic

## 2024-10-14 NOTE — PHYSICAL THERAPY NOTE
PHYSICAL THERAPY CANCELLATION NOTE          Patient Name: Ashlee Bermeo  Today's Date: 10/14/2024           10/14/24 0931   Note Type   Note type Cancelled Session   Cancel Reasons Medical status   Additional Comments PT eval orders received. Attempted to see pt for PT eval; however, pt's BP currently 206/88. Will hold PT eval and mobility at this time. PT will continue to follow pt as appropriate and as schedule allows. CLARY Hand updated post.         Ailin Pantoja, PT, DPT  10/14/24

## 2024-10-14 NOTE — CASE MANAGEMENT
Case Management Assessment    Patient name Ashlee Bermeo  Location S /S -01 MRN [id number]  : 1945 Date 10/14/2024       Current Admission Date: 10/11/2024  Current Admission Diagnosis:AMS (altered mental status)   Patient Active Problem List    Diagnosis Date Noted Date Diagnosed    DEMI (acute kidney injury) (HCC) 10/12/2024     Leukocytosis 10/12/2024     B12 deficiency 10/12/2024     AMS (altered mental status) 10/11/2024     Hyponatremia 10/11/2024     HTN (hypertension) 10/11/2024     HLD (hyperlipidemia) 10/11/2024     Hypothyroid 10/11/2024     CKD (chronic kidney disease) 10/11/2024     H/O fall 10/11/2024     Anemia 10/11/2024     Cough 10/11/2024       LOS (days): 3  Geometric Mean LOS (GMLOS) (days): 3.6  Days to GMLOS:0.6     OBJECTIVE:    Risk of Unplanned Readmission Score: 11.38         Current admission status: Inpatient       Preferred Pharmacy: No Pharmacies Listed  Primary Care Provider: No primary care provider on file.    Primary Insurance: MEDICARE  Secondary Insurance:     ASSESSMENT:  Active Health Care Proxies    There are no active Health Care Proxies on file.                      Patient Information  Admitted from:: Home  Mental Status: Alert  During Assessment patient was accompanied by: Son  Assessment information provided by:: Patient, Son  Primary Caregiver: Self  Support Systems: Family members  Home entry access options. Select all that apply.: Elevator, No steps to enter home  Type of Current Residence: Apartment  Floor Level: 3  Upon entering residence, is there a bedroom on the main floor (no further steps)?: Yes  Upon entering residence, is there a bathroom on the main floor (no further steps)?: Yes  Living Arrangements: Lives w/ Son    Activities of Daily Living Prior to Admission  Functional Status: Independent  Completes ADLs independently?: Yes  Ambulates independently?: Yes  Does patient use assisted devices?: No  Does patient currently own DME?:  No  Does patient have a history of Outpatient Therapy (PT/OT)?: Yes  Does the patient have a history of Short-Term Rehab?: No  Does patient have a history of HHC?: No  Does patient currently have HHC?: No         Patient Information Continued  Does patient have prescription coverage?: Yes  Does patient receive dialysis treatments?: No         Means of Transportation  Means of Transport to [facility]:: Family transport      Social Determinants of Health (SDOH)      Flowsheet Row Most Recent Value   Housing Stability    In the last 12 months, was there a time when you were not able to pay the mortgage or rent on time? N   At any time in the past 12 months, were you homeless or living in a shelter (including now)? N   Transportation Needs    In the past 12 months, has lack of transportation kept you from medical appointments or from getting medications? no   In the past 12 months, has lack of transportation kept you from meetings, work, or from getting things needed for daily living? No   Food Insecurity    Within the past 12 months, you worried that your food would run out before you got the money to buy more. Never true   Within the past 12 months, the food you bought just didn't last and you didn't have money to get more. Never true   Utilities    In the past 12 months has the electric, gas, oil, or water company threatened to shut off services in your home? No

## 2024-10-14 NOTE — QUICK NOTE
Discussed with primary team, patient, and patient's son. Pt now at baseline. No focal neurological deficits.  Low suspicion for primary neurological event.     Will discontinue MRI and EEG at this time.     No further inpatient neurology recommendations at this time. Thank you for allowing us to be a part of her care. Please let us know if there are any acute questions or concerns.

## 2024-10-14 NOTE — ASSESSMENT & PLAN NOTE
Creatinine baseline ~ 1.1-.13   Increased creatinine to 1.51 -> 1.41  Likely prerenal in setting of hypovolemia on exam and poor p.o. intake  Renal function improved and normalized

## 2024-10-14 NOTE — PROGRESS NOTES
Progress Note - Hospitalist   Name: Ashlee Bermeo 79 y.o. female I MRN: [id number]  Unit/Bed#: S -01 I Date of Admission: 10/11/2024   Date of Service: 10/14/2024 I Hospital Day: 3    Assessment & Plan  AMS (altered mental status)  POA with confusion, delirium starting this am, last well known yesterday as per son  Denies any sx of URI, c/o of dysuria, chest pain, fever, chills or any other complains  Pt's so admits mild cough yesterday with no fever  POA patient was disoriented, delirious, restless and agitated, unable to answer questions or to provide history.   TSH elevated at 5.3, but T4 normal.  Normal UA  Normal coma panel, normal ammonia  CTH and CTA h&n showed no acute bleeding or significant occlusion  S/p  cc bolus in ED, load dose of asa 325 and plavix 300, ativan 0.5 for agitation  Mentation significantly improved, AAO x 2.  Mental status baseline confirmed at bedside with son  Venous complex lower extremities negative for acute or chronic DVT bilaterally  Echo LVEF 65%, grade I (abnormal) relaxation  No concerns for stroke or TIA.  Per neurology, encephalopathy likely due to TME versus seizure activity due to severe hyponatremia  Currently back to baseline Aox3    Plan:  Neurology sign off, no further inpatient recs  Follow daily BMP, replete electrolytes as needed  PT/OT eval   Fall precautions    Hyponatremia  POA with confusion and sodium 126  Similar presentation in 2021 as per pt's son, mentation improved at that time with sodium normalization  H/o of fall about 2.5 weeks ago, pt had mild pain since then and was taking tylenol, denies NSAIDs  Hypovolemic on exam  Serum osmolality normal at 286  Recent Labs     10/12/24  2020 10/13/24  0806 10/13/24  2038   SODIUM 130* 133* 136     Sodium stabilized and improving    Plan:  Monitor BMP    DEMI (acute kidney injury) (HCC)  Creatinine baseline ~ 1.1-.13   Increased creatinine to 1.51 -> 1.41  Likely prerenal in setting of hypovolemia on exam  "and poor p.o. intake  Renal function improved and normalized  Leukocytosis  Leukocytosis of 12.5 with neutrophils and segmented  Unknown source  Chest x-ray unremarkable, UA normal  Continue empiric IV Rocephin  Monitor vital signs and CBC  Anemia  POA with HGB 10.4, normocytic, RDW within normal range  Reported black stools by pt's son  H/o of CKD 3  LDH normal, folate normal, iron panel within normal limits  Follow daily CBC, transfuse if HGB <7  Recommend GI follow up in o/p settings    HTN (hypertension)  History confirmed with PCP office  PTA meds atenolol 25 mg daily  Pt was hypertensive on admission with , improved without intervention to , possibly agitation contributing, allow permissive hypertension for the next 24 h  Continue atenolol  Hydralazine 5 mg PRN for SBP >180  HLD (hyperlipidemia)  As per PCP office pt is not on statin  , cholesterol 168.   Patient and son declined statin  Hypothyroid  Not taking levothyroxine as per PCP office  TSH is slightly elevated to 5.3  Normal free T4  CKD (chronic kidney disease)  Lab Results   Component Value Date    EGFR 42 10/13/2024    EGFR 41 10/13/2024    EGFR 34 10/12/2024    CREATININE 1.21 10/13/2024    CREATININE 1.24 10/13/2024    CREATININE 1.42 (H) 10/12/2024   Avoid hypotension and nephrotoxic medications  Continue to monitor daily renal function  Continue with gentle IV fluid hydration with normal saline 50 cc/h x 24 hours  H/O fall  Pt's son reported fall about 2.5 weeks ago on her \"knees and hands\", managed with tylenol  Pt was complaining of R shoulder and right lateral chest wall pain (possibly ribs)  Xray chest was unremarkable  Obtain Right shoulder Xray  Right knee x-ray ordered  Tylenol for pain prn  Voltaren gel and lidocaine patch prn  Orthostatic vitals prior to discharge    Cough  History of cough x 1 day, afebrile  Chest x-ray unremarkable.  COVID/flu/RSV negative  Procalcitonin negative  Leukocytosis of 12.5, however " normalized  CT C/A/P showed no acute findings or evidence for malignancy   Treated empirically with Rocephin x3  Monitor off antibiotic    B12 deficiency  Lab Results   Component Value Date    [id number] 209 10/11/2024     Vitamin B12 levels low at 2 9, patient is a candidate for IM B12 supplementation  1000 mcg daily x 5 days followed by 1000 mcg p.o. daily  Follow-up with PCP as outpatient    VTE Pharmacologic Prophylaxis: VTE Score: 8 High Risk (Score >/= 5) - Pharmacological DVT Prophylaxis Ordered: heparin. Sequential Compression Devices Ordered.    Mobility:   Basic Mobility Inpatient Raw Score: 20  JH-HLM Goal: 6: Walk 10 steps or more  JH-HLM Achieved: 7: Walk 25 feet or more  JH-HLM Goal achieved. Continue to encourage appropriate mobility.    Patient Centered Rounds: I performed bedside rounds with nursing staff today.   Discussions with Specialists or Other Care Team Provider: Neurology    Education and Discussions with Family / Patient: Updated  (son) at bedside.    Current Length of Stay: 3 day(s)  Current Patient Status: Inpatient   Certification Statement: The patient will continue to require additional inpatient hospital stay due to ongoing management  Discharge Plan: Anticipate discharge in 24-48 hrs to discharge location to be determined pending rehab evaluations.    Code Status: Level 1 - Full Code    Subjective   Seen and evaluated with son at bedside. No overnight events. However, patient and son reports patient has been feeling dizzy, weak and nauseous. Has not used PRN antiemetic medication.   Discussed with neurology at bedside with patient and son in regards to no need of MRI brain and EEG and they agreed.     Objective :  Temp:  [98 °F (36.7 °C)-98.4 °F (36.9 °C)] 98.4 °F (36.9 °C)  HR:  [63-69] 66  BP: (133-211)/(54-85) 161/74  Resp:  [17] 17  SpO2:  [95 %-96 %] 95 %  O2 Device: None (Room air)    Body mass index is 23.91 kg/m².     Input and Output Summary (last 24 hours):      Intake/Output Summary (Last 24 hours) at 10/14/2024 0638  Last data filed at 10/14/2024 0100  Gross per 24 hour   Intake 1840 ml   Output 2675 ml   Net -835 ml       Physical Exam  Constitutional:       Appearance: Normal appearance.   HENT:      Head: Normocephalic and atraumatic.   Cardiovascular:      Rate and Rhythm: Normal rate and regular rhythm.      Pulses: Normal pulses.      Heart sounds: Normal heart sounds.   Pulmonary:      Effort: Pulmonary effort is normal.      Breath sounds: Normal breath sounds. No wheezing or rales.   Abdominal:      General: Bowel sounds are normal.   Musculoskeletal:      Right lower leg: No edema.      Left lower leg: No edema.   Skin:     Capillary Refill: Capillary refill takes less than 2 seconds.   Neurological:      General: No focal deficit present.      Mental Status: She is alert and oriented to person, place, and time.           Telemetry:  Telemetry Orders (From admission, onward)               24 Hour Telemetry Monitoring  Continuous x 24 Hours (Telem)           Question:  Reason for 24 Hour Telemetry  Answer:  TIA/Suspected CVA/ Confirmed CVA                     Telemetry Reviewed: Normal Sinus Rhythm  Indication for Continued Telemetry Use: No indication for continued use. Will discontinue.                Lab Results: I have reviewed the following results:   Results from last 7 days   Lab Units 10/14/24  0432 10/13/24  0806   WBC Thousand/uL 7.99 7.59   HEMOGLOBIN g/dL 9.5* 9.2*   HEMATOCRIT % 26.2* 27.5*   PLATELETS Thousands/uL 237 212   SEGS PCT %  --  78*   LYMPHO PCT %  --  13*   MONO PCT %  --  7   EOS PCT %  --  2     Results from last 7 days   Lab Units 10/13/24  2038   SODIUM mmol/L 136   POTASSIUM mmol/L 4.3   CHLORIDE mmol/L 102   CO2 mmol/L 27   BUN mg/dL 27*   CREATININE mg/dL 1.21   ANION GAP mmol/L 7   CALCIUM mg/dL 8.6   GLUCOSE RANDOM mg/dL 120     Results from last 7 days   Lab Units 10/11/24  1238   INR  0.93     Results from last 7  days   Lab Units 10/11/24  1215   POC GLUCOSE mg/dl 103     Results from last 7 days   Lab Units 10/12/24  0349   HEMOGLOBIN A1C % 5.6     Results from last 7 days   Lab Units 10/13/24  0806 10/11/24  1238   PROCALCITONIN ng/ml 0.17 <0.05       Recent Cultures (last 7 days):   Results from last 7 days   Lab Units 10/11/24  2009   BLOOD CULTURE  No Growth at 48 hrs.  No Growth at 48 hrs.             Last 24 Hours Medication List:     Current Facility-Administered Medications:     acetaminophen (TYLENOL) rectal suppository 650 mg, Q6H PRN    aspirin chewable tablet 81 mg, Daily    aspirin tablet 325 mg, Once    atenolol (TENORMIN) tablet 25 mg, Daily    ceftriaxone (ROCEPHIN) 1 g/50 mL in dextrose IVPB, Q24H, Last Rate: 1,000 mg (10/13/24 1747)    clopidogrel (PLAVIX) tablet 300 mg, Once    cyanocobalamin injection 1,000 mcg, Daily **FOLLOWED BY** [START ON 10/16/2024] cyanocobalamin (VITAMIN B-12) tablet 1,000 mcg, Daily    diphenhydrAMINE (BENADRYL) injection 50 mg, Q6H PRN    heparin (porcine) subcutaneous injection 5,000 Units, Q8H SUNNI    hydrALAZINE (APRESOLINE) injection 5 mg, Q6H PRN    sodium chloride 0.9 % infusion, Continuous, Last Rate: 50 mL/hr (10/13/24 2250)    trimethobenzamide (TIGAN) IM injection 200 mg, Q6H PRN    Administrative Statements   Today, Patient Was Seen By: Angelica Ayon MD      **Please Note: This note may have been constructed using a voice recognition system.**

## 2024-10-14 NOTE — ASSESSMENT & PLAN NOTE
History confirmed with PCP office  PTA meds atenolol 25 mg daily  Pt was hypertensive on admission with , improved without intervention to , possibly agitation contributing, allow permissive hypertension for the next 24 h  Continue atenolol  Hydralazine 5 mg PRN for SBP >180

## 2024-10-15 PROBLEM — R42 DIZZINESS: Status: ACTIVE | Noted: 2024-10-15

## 2024-10-15 LAB
ANION GAP SERPL CALCULATED.3IONS-SCNC: 7 MMOL/L (ref 4–13)
BUN SERPL-MCNC: 24 MG/DL (ref 5–25)
CALCIUM SERPL-MCNC: 9 MG/DL (ref 8.4–10.2)
CHLORIDE SERPL-SCNC: 101 MMOL/L (ref 96–108)
CO2 SERPL-SCNC: 24 MMOL/L (ref 21–32)
CREAT SERPL-MCNC: 1.07 MG/DL (ref 0.6–1.3)
ERYTHROCYTE [DISTWIDTH] IN BLOOD BY AUTOMATED COUNT: 13.3 % (ref 11.6–15.1)
GFR SERPL CREATININE-BSD FRML MDRD: 49 ML/MIN/1.73SQ M
GLUCOSE SERPL-MCNC: 83 MG/DL (ref 65–140)
HCT VFR BLD AUTO: 31.8 % (ref 34.8–46.1)
HGB BLD-MCNC: 10.5 G/DL (ref 11.5–15.4)
MCH RBC QN AUTO: 28.3 PG (ref 26.8–34.3)
MCHC RBC AUTO-ENTMCNC: 33 G/DL (ref 31.4–37.4)
MCV RBC AUTO: 86 FL (ref 82–98)
METHYLMALONATE SERPL-SCNC: 268 NMOL/L (ref 0–378)
PLATELET # BLD AUTO: 355 THOUSANDS/UL (ref 149–390)
PMV BLD AUTO: 8.8 FL (ref 8.9–12.7)
POTASSIUM SERPL-SCNC: 4.7 MMOL/L (ref 3.5–5.3)
RBC # BLD AUTO: 3.71 MILLION/UL (ref 3.81–5.12)
SODIUM SERPL-SCNC: 132 MMOL/L (ref 135–147)
WBC # BLD AUTO: 8.98 THOUSAND/UL (ref 4.31–10.16)

## 2024-10-15 PROCEDURE — 99232 SBSQ HOSP IP/OBS MODERATE 35: CPT | Performed by: INTERNAL MEDICINE

## 2024-10-15 PROCEDURE — 85027 COMPLETE CBC AUTOMATED: CPT

## 2024-10-15 PROCEDURE — 97167 OT EVAL HIGH COMPLEX 60 MIN: CPT

## 2024-10-15 PROCEDURE — 80048 BASIC METABOLIC PNL TOTAL CA: CPT

## 2024-10-15 PROCEDURE — 97163 PT EVAL HIGH COMPLEX 45 MIN: CPT

## 2024-10-15 PROCEDURE — 97129 THER IVNTJ 1ST 15 MIN: CPT

## 2024-10-15 RX ORDER — MECLIZINE HCL 12.5 MG 12.5 MG/1
12.5 TABLET ORAL EVERY 8 HOURS PRN
Status: CANCELLED | OUTPATIENT
Start: 2024-10-15

## 2024-10-15 RX ORDER — ATENOLOL 25 MG/1
25 TABLET ORAL
Status: DISCONTINUED | OUTPATIENT
Start: 2024-10-15 | End: 2024-10-16 | Stop reason: HOSPADM

## 2024-10-15 RX ORDER — ATENOLOL 25 MG/1
25 TABLET ORAL
Status: DISCONTINUED | OUTPATIENT
Start: 2024-10-15 | End: 2024-10-15

## 2024-10-15 RX ADMIN — ATENOLOL 25 MG: 25 TABLET ORAL at 22:26

## 2024-10-15 RX ADMIN — CYANOCOBALAMIN 1000 MCG: 1000 INJECTION, SOLUTION INTRAMUSCULAR; SUBCUTANEOUS at 09:47

## 2024-10-15 RX ADMIN — BISACODYL 5 MG: 5 TABLET, COATED ORAL at 22:26

## 2024-10-15 NOTE — PHYSICAL THERAPY NOTE
"                                                PHYSICAL THERAPY EVALUATION NOTE          Patient Name: sAhlee Bermeo  Today's Date: 10/15/2024        AGE:   79 y.o.  Mrn:   [id number]  ADMIT DX:  Hyponatremia [E87.1]  Altered mental status [R41.82]  Anemia [D64.9]  Stroke-like symptoms [R29.90]    Past Medical History:  No past medical history on file.    Past Surgical History:  No past surgical history on file.  Length Of Stay: 4        PHYSICAL THERAPY EVALUATION:    Patient's identity confirmed via 2 patient identifiers (full name and ) at start of session       10/15/24 0819   PT Last Visit   PT Visit Date 10/15/24   Note Type   Note type Evaluation   Pain Assessment   Pain Assessment Tool FLACC   Pain Location/Orientation Location: Abdomen   Pain Onset/Description Descriptor: Aching  (\"stomach ache\")   Pain Rating: FLACC (Rest) - Face 0   Pain Rating: FLACC (Rest) - Legs 0   Pain Rating: FLACC (Rest) - Activity 0   Pain Rating: FLACC (Rest) - Cry 1   Pain Rating: FLACC (Rest) - Consolability 1   Score: FLACC (Rest) 2   Pain Rating: FLACC (Activity) - Face 0   Pain Rating: FLACC (Activity) - Legs 0   Pain Rating: FLACC (Activity) - Activity 0   Pain Rating: FLACC (Activity) - Cry 1   Pain Rating: FLACC (Activity) - Consolability 1   Score: FLACC (Activity) 2   Restrictions/Precautions   Weight Bearing Precautions Per Order No   Other Precautions Cognitive;Chair Alarm;Bed Alarm;Fall Risk;Pain   Home Living   Type of Home Apartment   Home Layout One level;Performs ADLs on one level;Able to live on main level with bedroom/bathroom;Elevator  (0 MAVIS)   Bathroom Shower/Tub Tub/shower unit   Bathroom Equipment Grab bars in shower   Bathroom Accessibility Accessible   Home Equipment   (none)   Prior Function   Level of [place] Independent with functional mobility;Independent with ADLs;Needs assistance with IADLS   Lives With Son  (works from home)   Receives Help From Family   IADLs Family/Friend/Other provides " "transportation;Family/Friend/Other provides meals;Family/Friend/Other provides medication management  (pt reports her son has been handling house chores recently)   Falls in the last 6 months 1 to 4   Comments At baseline pt amb ind w/o AD   General   Additional Pertinent History Per Neurology note 10/14/24, low suspicion for primary neurological event, will DC MRI and EEG, no further neurology recommendations   Family/Caregiver Present No   Cognition   Overall Cognitive Status Impaired   Arousal/Participation Cooperative   Orientation Level Oriented X4   Memory Decreased recall of recent events;Decreased recall of precautions   Following Commands Follows one step commands without difficulty   Comments Pt ID via name and ; pt agreeable to PT eval and mobility, pleasant throughout   Subjective   Subjective \"I would be ok if I could get rid of this stomach ache and dizziness\"   RLE Assessment   RLE Assessment WFL  (grossly assessed w/ functional mobility, at least 3+/5)   LLE Assessment   LLE Assessment WFL  (grossly assessed w/ functional mobility, at least 3+/5)   Light Touch   RLE Light Touch Grossly intact   LLE Light Touch Grossly intact   Bed Mobility   Supine to Sit 5  Supervision   Additional items Assist x 1;HOB elevated;Bedrails;Increased time required;Verbal cues   Transfers   Sit to Stand 5  Supervision   Additional items Assist x 1;Increased time required;Verbal cues   Stand to Sit 5  Supervision   Additional items Assist x 1;Armrests;Increased time required;Verbal cues   Additional Comments 2 sit<>stand transfers   Ambulation/Elevation   Gait pattern Decreased foot clearance;Short stride;Excessively slow;Decreased hip extension   Gait Assistance 5  Supervision   Additional items Assist x 1   Assistive Device None   Distance 90'   Ambulation/Elevation Additional Comments pt w/ c/o ongoing dizzy feeling (worse while up and walking, pt reports unchanged from yesterday)   Balance   Static Sitting Fair + "   Dynamic Sitting Fair   Static Standing Fair   Dynamic Standing Fair -   Ambulatory Fair -   Activity Tolerance   Activity Tolerance Patient tolerated treatment well   Medical Staff Made Aware Pt benefited from PT/OT care coordination w/ OT Rebecca due to cognitive/behavioral impairments affecting functional mobility and to allow for challenge of pt's activity tolerance, PT and OT goals were addressed individually during session   Nurse Made Aware CLARY Hand   Assessment   Prognosis Good   Problem List Decreased endurance;Impaired balance;Decreased mobility;Decreased cognition;Impaired judgement;Decreased safety awareness   Assessment Ashlee Bermeo is a 79 y.o. Female who presents to [facility] on 10/11/24 due to stroke-like symptoms and diagnosis of AMS. Orders for PT eval and treat received, w/ activity orders of up and OOB as tolerated. Comorbidities affecting pt's functional mobility at time of evaluation include: hyponatremia, HTN, CKD, anemia, DEMI. Personal factors affecting DC include: decreased cognition, positive fall history, and inability to perform IADLs. At baseline, pt mobilizes independently w/ no AD, and w/ 1-2 fall(s) in the previous 6 months. Upon evaluation, pt presents w/ the following deficits: impaired balance, impaired cognition, decreased endurance/activity tolerance, pain affecting mobility, and gait deviations. Pt currently requires  supervision for bed mobility, supervision for transfers, supervision w/ no AD for ambulation. Pt's clinical presentation is unstable/unpredictable due to abnormal lab values, pain affecting mobility tolerance, recent h/o falls, ongoing medical management. From a PT/mobility standpoint given the above findings, DC recommendation is level: III (Minimum Rehab Resource Intensity). During current admission, pt will benefit from continued skilled inpatient PT in the acute care setting in order to address the above deficits and to maximize function and mobility prior to DC  from acute care.   Goals   Patient Goals to feel better   [facility] Expiration Date 10/25/24   Short Term Goal #1 Pt will: perform bed mobility w/ mod I to decrease pt's burden of care and increase pt's independence w/ repositioning in bed; perform transfers w/ mod I to promote OOB mobility; ambulate at least 250' w/ LRAD and mod I to increase pt's ambulatory endurance/tolerance; increase all balance ratings by at least 1 grade to decrease pt's risk of falls   PT Treatment Day 0   Plan   Treatment/Interventions Functional transfer training;LE strengthening/ROM;Therapeutic exercise;Endurance training;Patient/family training;Equipment eval/education;Bed mobility;Gait training;Compensatory technique education   PT Frequency 2-3x/wk   Discharge Recommendation   Rehab Resource Intensity Level, PT III (Minimum Resource Intensity)   AM-PAC Basic Mobility Inpatient   Turning in Flat Bed Without Bedrails 4   Lying on Back to Sitting on Edge of Flat Bed Without Bedrails 3   Moving Bed to Chair 3   Standing Up From Chair Using Arms 3   Walk in Room 3   Climb 3-5 Stairs With Railing 3   Basic Mobility Inpatient Raw Score 19   Basic Mobility Standardized Score 42.48   [facility] Highest Level Of Mobility   -HL Goal 6: Walk 10 steps or more   -HLM Achieved 7: Walk 25 feet or more   End of Consult   Patient Position at End of Consult Bedside chair;Bed/Chair alarm activated;All needs within reach       The patient's AM-PAC Basic Mobility Inpatient Short Form Raw Score is 19. A Raw score of greater than 16 suggests the patient may benefit from discharge to home. Please also refer to the recommendation of the Physical Therapist for safe discharge planning.    Pt will benefit from skilled inpatient PT during this admission in order to facilitate progress towards goals and to maximize functional independence prior to DC      DC rec: level III (Minimum Rehab Resource Intensity)        Ailin Pantoja, PT, DPT  10/15/24

## 2024-10-15 NOTE — ASSESSMENT & PLAN NOTE
History confirmed with PCP office  PTA meds atenolol 25 mg daily  Pt was hypertensive on admission with , improved without intervention to , possibly agitation contributing, allow permissive hypertension for the next 24 h  Trialed HCTZ, however might contribute negatively to Na and was discontinued after one dose.  Continue atenolol  Monitor BP readings at home and keep a BP log. F/u with PCP

## 2024-10-15 NOTE — PLAN OF CARE
Problem: OCCUPATIONAL THERAPY ADULT  Goal: Performs self-care activities at highest level of function for planned discharge setting.  See evaluation for individualized goals.  Description: Treatment Interventions: ADL retraining, Functional transfer training, Endurance training, Cognitive reorientation, Patient/family training, Equipment evaluation/education, Compensatory technique education, Activityengagement, Energy conservation          See flowsheet documentation for full assessment, interventions and recommendations.   Note: Limitation: Decreased ADL status, Decreased Safe judgement during ADL, Decreased cognition, Decreased endurance, Decreased self-care trans, Decreased high-level ADLs (impaired balance, fxnl mobility, act sadie, fxnl reach, standing sadie, strength, attention to task, direction following, safety awareness, insight, pacing, orientation, problem solving, learning new tasks, initiation, response time, flat affect)  Prognosis: Good  Assessment: Pt is a 79 y.o. female seen for OT evaluation s/p admission to [facility] on 10/11/2024 due to AMS (altered mental status). Personal and env factors supporting pt at time of IE include (I) PLOF, supportive son, accessible home environment, and FFSU. Personal and env factors inhibiting engagement in occupations include advanced age and difficulty completing IADLs. Performance deficits that affect the pt’s occupational performance can be seen above. Due to pt's current functional limitations and medical complications pt is functioning below baseline. Pt would benefit from continued skilled OT treatment in order to maximize safety, independence and overall performance with ADLs, functional mobility, functional transfers, and cognition in order to achieve highest level of function.     Rehab Resource Intensity Level, OT: III (Minimum Resource Intensity) (OP cognitive therapy + Fitness to Drive)

## 2024-10-15 NOTE — PROGRESS NOTES
Progress Note - Hospitalist   Name: Ashlee Bermeo 79 y.o. female I MRN: [id number]  Unit/Bed#: S -01 I Date of Admission: 10/11/2024   Date of Service: 10/15/2024 I Hospital Day: 4    Assessment & Plan  AMS (altered mental status)  POA with confusion, delirium starting this am, last well known yesterday as per son  Denies any sx of URI, c/o of dysuria, chest pain, fever, chills or any other complains  Pt's so admits mild cough yesterday with no fever  POA patient was disoriented, delirious, restless and agitated, unable to answer questions or to provide history.   TSH elevated at 5.3, but T4 normal.  Normal UA  Normal coma panel, normal ammonia  CTH and CTA h&n showed no acute bleeding or significant occlusion  S/p  cc bolus in ED, load dose of asa 325 and plavix 300, ativan 0.5 for agitation  Mentation significantly improved, AAO x 2.  Mental status baseline confirmed at bedside with son  Venous complex lower extremities negative for acute or chronic DVT bilaterally  Echo LVEF 65%, grade I (abnormal) relaxation  No concerns for stroke or TIA.  Per neurology, encephalopathy likely due to TME versus seizure activity due to severe hyponatremia  Currently back to baseline Aox3    Plan:  Follow daily BMP, replete electrolytes as needed   Fall precautions    Hyponatremia  POA with confusion and sodium 126  Similar presentation in 2021 as per pt's son, mentation improved at that time with sodium normalization  H/o of fall about 2.5 weeks ago, pt had mild pain since then and was taking tylenol, denies NSAIDs  Hypovolemic on exam  Serum osmolality normal at 286  Recent Labs     10/13/24  2038 10/14/24  0432 10/15/24  0504   SODIUM 136 136 132*     Sodium stabilized and improving    Plan:  Monitor BMP  Encourage PO intake  HCTZ D/C    DEMI (acute kidney injury) (HCC)  Creatinine baseline ~ 1.1-.13   Increased creatinine to 1.51 -> 1.41  Likely prerenal in setting of hypovolemia on exam and poor p.o. intake  Renal  "function improved and normalized  Leukocytosis  Leukocytosis of 12.5 with neutrophils and segmented  Unknown source  Chest x-ray unremarkable, UA normal  Continue empiric IV Rocephin  Monitor vital signs and CBC  Anemia  POA with HGB 10.4, normocytic, RDW within normal range  Reported black stools by pt's son  H/o of CKD 3  LDH normal, folate normal, iron panel within normal limits  Follow daily CBC, transfuse if HGB <7  Recommend GI follow up in o/p settings    Dizziness  Ongoing dizziness likely 2/2 deconditioning as well as poor PO intake  Orthostatics on admission was negative  Recommended Meclizine, however patient declined  Will recheck orthostatics prior to discharge  HTN (hypertension)  History confirmed with PCP office  PTA meds atenolol 25 mg daily  Pt was hypertensive on admission with , improved without intervention to , possibly agitation contributing, allow permissive hypertension for the next 24 h  Trialed HCTZ, however might contribute negatively to Na and was discontinued after one dose.  Continue atenolol  Monitor BP readings at home and keep a BP log. F/u with PCP  HLD (hyperlipidemia)  As per PCP office pt is not on statin  , cholesterol 168.   Patient and son declined statin  Hypothyroid  Not taking levothyroxine as per PCP office  TSH is slightly elevated to 5.3  Normal free T4  CKD (chronic kidney disease)  Lab Results   Component Value Date    EGFR 49 10/15/2024    EGFR 45 10/14/2024    EGFR 42 10/13/2024    CREATININE 1.07 10/15/2024    CREATININE 1.15 10/14/2024    CREATININE 1.21 10/13/2024   Avoid hypotension and nephrotoxic medications  Continue to monitor daily renal function  Continue with gentle IV fluid hydration with normal saline 50 cc/h x 24 hours  H/O fall  Pt's son reported fall about 2.5 weeks ago on her \"knees and hands\", managed with tylenol  Pt was complaining of R shoulder and right lateral chest wall pain (possibly ribs)  Xray chest was " unremarkable  Obtain Right shoulder Xray  Right knee x-ray ordered  Tylenol for pain prn  Voltaren gel and lidocaine patch prn  Orthostatic vitals prior to discharge    Cough  History of cough x 1 day, afebrile  Chest x-ray unremarkable.  COVID/flu/RSV negative  Procalcitonin negative  Leukocytosis of 12.5, however normalized  CT C/A/P showed no acute findings or evidence for malignancy   Treated empirically with Rocephin x3  Monitor off antibiotic    B12 deficiency  Lab Results   Component Value Date    [id number] 209 10/11/2024     Vitamin B12 levels low at 2 9, patient is a candidate for IM B12 supplementation  1000 mcg daily x 5 days followed by 1000 mcg p.o. daily  Follow-up with PCP as outpatient    VTE Pharmacologic Prophylaxis: VTE Score: 8 High Risk (Score >/= 5) - Pharmacological DVT Prophylaxis Ordered: heparin. Sequential Compression Devices Ordered.    Mobility:   Basic Mobility Inpatient Raw Score: 19  JH-HLM Goal: 6: Walk 10 steps or more  JH-HLM Achieved: 7: Walk 25 feet or more  JH-HLM Goal achieved. Continue to encourage appropriate mobility.    Patient Centered Rounds: I performed bedside rounds with nursing staff today.   Discussions with Specialists or Other Care Team Provider: IM attending    Education and Discussions with Family / Patient: Updated  (son) at bedside.    Current Length of Stay: 4 day(s)  Current Patient Status: Inpatient   Certification Statement: The patient will continue to require additional inpatient hospital stay due to ongoing management  Discharge Plan: Anticipate discharge tomorrow to home.    Code Status: Level 1 - Full Code    Subjective   Seen and evaluated at bedside. Pt reports eating an egg sandwich last night after not eating all day. She continues to experience dizziness when she is sitting or walking. Nausea has improved. No overnight events.    Objective :  Temp:  [97.6 °F (36.4 °C)-98.4 °F (36.9 °C)] 97.6 °F (36.4 °C)  HR:  [65-74] 65  BP:  (151-169)/(70-78) 151/78  Resp:  [16-19] 19  SpO2:  [94 %-98 %] 98 %    Body mass index is 23.91 kg/m².     Input and Output Summary (last 24 hours):     Intake/Output Summary (Last 24 hours) at 10/15/2024 1229  Last data filed at 10/15/2024 0900  Gross per 24 hour   Intake 580 ml   Output 1800 ml   Net -1220 ml       Physical Exam  Constitutional:       Appearance: Normal appearance. She is not ill-appearing.   HENT:      Head: Normocephalic and atraumatic.   Cardiovascular:      Rate and Rhythm: Normal rate and regular rhythm.      Pulses: Normal pulses.      Heart sounds: Normal heart sounds.   Pulmonary:      Effort: Pulmonary effort is normal.      Breath sounds: Normal breath sounds. No wheezing or rales.   Abdominal:      General: Bowel sounds are normal.      Palpations: Abdomen is soft.   Musculoskeletal:      Right lower leg: No edema.      Left lower leg: No edema.   Skin:     General: Skin is warm.   Neurological:      Mental Status: She is alert.               Lab Results: I have reviewed the following results:   Results from last 7 days   Lab Units 10/15/24  0504 10/14/24  0432 10/13/24  0806   WBC Thousand/uL 8.98   < > 7.59   HEMOGLOBIN g/dL 10.5*   < > 9.2*   HEMATOCRIT % 31.8*   < > 27.5*   PLATELETS Thousands/uL 355   < > 212   SEGS PCT %  --   --  78*   LYMPHO PCT %  --   --  13*   MONO PCT %  --   --  7   EOS PCT %  --   --  2    < > = values in this interval not displayed.     Results from last 7 days   Lab Units 10/15/24  0504   SODIUM mmol/L 132*   POTASSIUM mmol/L 4.7   CHLORIDE mmol/L 101   CO2 mmol/L 24   BUN mg/dL 24   CREATININE mg/dL 1.07   ANION GAP mmol/L 7   CALCIUM mg/dL 9.0   GLUCOSE RANDOM mg/dL 83     Results from last 7 days   Lab Units 10/11/24  1238   INR  0.93     Results from last 7 days   Lab Units 10/11/24  1215   POC GLUCOSE mg/dl 103     Results from last 7 days   Lab Units 10/12/24  0349   HEMOGLOBIN A1C % 5.6     Results from last 7 days   Lab Units 10/13/24  0806  10/11/24  1238   PROCALCITONIN ng/ml 0.17 <0.05       Recent Cultures (last 7 days):   Results from last 7 days   Lab Units 10/11/24  2009   BLOOD CULTURE  No Growth at 72 hrs.  No Growth at 72 hrs.             Last 24 Hours Medication List:     Current Facility-Administered Medications:     acetaminophen (TYLENOL) rectal suppository 650 mg, Q6H PRN    atenolol (TENORMIN) tablet 25 mg, Daily    bisacodyl (DULCOLAX) EC tablet 5 mg, HS    [COMPLETED] cyanocobalamin injection 1,000 mcg, Daily **FOLLOWED BY** [START ON 10/16/2024] cyanocobalamin (VITAMIN B-12) tablet 1,000 mcg, Daily    diphenhydrAMINE (BENADRYL) injection 50 mg, Q6H PRN    heparin (porcine) subcutaneous injection 5,000 Units, Q8H SUNNI    hydrALAZINE (APRESOLINE) injection 5 mg, Q6H PRN    polyethylene glycol (MIRALAX) packet 17 g, Daily    trimethobenzamide (TIGAN) IM injection 200 mg, Q6H PRN    Administrative Statements   Today, Patient Was Seen By: Angelica Ayon MD      **Please Note: This note may have been constructed using a voice recognition system.**

## 2024-10-15 NOTE — ASSESSMENT & PLAN NOTE
POA with confusion, delirium starting this am, last well known yesterday as per son  Denies any sx of URI, c/o of dysuria, chest pain, fever, chills or any other complains  Pt's so admits mild cough yesterday with no fever  POA patient was disoriented, delirious, restless and agitated, unable to answer questions or to provide history.   TSH elevated at 5.3, but T4 normal.  Normal UA  Normal coma panel, normal ammonia  CTH and CTA h&n showed no acute bleeding or significant occlusion  S/p  cc bolus in ED, load dose of asa 325 and plavix 300, ativan 0.5 for agitation  Mentation significantly improved, AAO x 2.  Mental status baseline confirmed at bedside with son  Venous complex lower extremities negative for acute or chronic DVT bilaterally  Echo LVEF 65%, grade I (abnormal) relaxation  No concerns for stroke or TIA.  Per neurology, encephalopathy likely due to TME versus seizure activity due to severe hyponatremia  Currently back to baseline Aox3    Plan:  Follow daily BMP, replete electrolytes as needed   Fall precautions

## 2024-10-15 NOTE — CASE MANAGEMENT
Case Management Discharge Planning Note    Patient name Ashlee Bermeo  Location S /S -01 MRN [id number]  : 1945 Date 10/15/2024       Current Admission Date: 10/11/2024  Current Admission Diagnosis:AMS (altered mental status)   Patient Active Problem List    Diagnosis Date Noted Date Diagnosed    DEMI (acute kidney injury) (HCC) 10/12/2024     Leukocytosis 10/12/2024     B12 deficiency 10/12/2024     AMS (altered mental status) 10/11/2024     Hyponatremia 10/11/2024     HTN (hypertension) 10/11/2024     HLD (hyperlipidemia) 10/11/2024     Hypothyroid 10/11/2024     CKD (chronic kidney disease) 10/11/2024     H/O fall 10/11/2024     Anemia 10/11/2024     Cough 10/11/2024       LOS (days): 4  Geometric Mean LOS (GMLOS) (days): 3.6  Days to GMLOS:-0.2     OBJECTIVE:  Risk of Unplanned Readmission Score: 11.92         Current admission status: Inpatient   Preferred Pharmacy: No Pharmacies Listed  Primary Care Provider: No primary care provider on file.    Primary Insurance: MEDICARE  Secondary Insurance:     DISCHARGE DETAILS:                                                                                     IMM reviewed with patient and caregiver, patient and caregiver agrees with discharge determination.  IMM Given (Date):: 10/15/24  IMM Given to:: Patient  Family notified:: Patient and pt's son Manuel at bedside

## 2024-10-15 NOTE — ASSESSMENT & PLAN NOTE
POA with confusion and sodium 126  Similar presentation in 2021 as per pt's son, mentation improved at that time with sodium normalization  H/o of fall about 2.5 weeks ago, pt had mild pain since then and was taking tylenol, denies NSAIDs  Hypovolemic on exam  Serum osmolality normal at 286  Recent Labs     10/13/24  2038 10/14/24  0432 10/15/24  0504   SODIUM 136 136 132*     Sodium stabilized and improving    Plan:  Monitor BMP  Encourage PO intake  HCTZ D/C

## 2024-10-15 NOTE — PLAN OF CARE
Problem: PHYSICAL THERAPY ADULT  Goal: Performs mobility at highest level of function for planned discharge setting.  See evaluation for individualized goals.  Description: Treatment/Interventions: Functional transfer training, LE strengthening/ROM, Therapeutic exercise, Endurance training, Patient/family training, Equipment eval/education, Bed mobility, Gait training, Compensatory technique education          See flowsheet documentation for full assessment, interventions and recommendations.  Note: Prognosis: Good  Problem List: Decreased endurance, Impaired balance, Decreased mobility, Decreased cognition, Impaired judgement, Decreased safety awareness  Assessment: Ashlee Bermeo is a 79 y.o. Female who presents to [facility] on 10/11/24 due to stroke-like symptoms and diagnosis of AMS. Orders for PT eval and treat received, w/ activity orders of up and OOB as tolerated. Comorbidities affecting pt's functional mobility at time of evaluation include: hyponatremia, HTN, CKD, anemia, DEMI. Personal factors affecting DC include: decreased cognition, positive fall history, and inability to perform IADLs. At baseline, pt mobilizes independently w/ no AD, and w/ 1-2 fall(s) in the previous 6 months. Upon evaluation, pt presents w/ the following deficits: impaired balance, impaired cognition, decreased endurance/activity tolerance, pain affecting mobility, and gait deviations. Pt currently requires  supervision for bed mobility, supervision for transfers, supervision w/ no AD for ambulation. Pt's clinical presentation is unstable/unpredictable due to abnormal lab values, pain affecting mobility tolerance, recent h/o falls, ongoing medical management. From a PT/mobility standpoint given the above findings, DC recommendation is level: III (Minimum Rehab Resource Intensity). During current admission, pt will benefit from continued skilled inpatient PT in the acute care setting in order to address the above deficits and to maximize  function and mobility prior to DC from acute care.        Rehab Resource Intensity Level, PT: III (Minimum Resource Intensity)    See flowsheet documentation for full assessment.

## 2024-10-15 NOTE — OCCUPATIONAL THERAPY NOTE
Occupational Therapy Evaluation + Cognitive Evaluation (MoCA)     Patient Name: Ashlee Bermeo  Today's Date: 10/15/2024  Problem List  Principal Problem:    AMS (altered mental status)  Active Problems:    Hyponatremia    HTN (hypertension)    HLD (hyperlipidemia)    Hypothyroid    CKD (chronic kidney disease)    H/O fall    Anemia    Cough    DEMI (acute kidney injury) (HCC)    Leukocytosis    B12 deficiency    Past Medical History  No past medical history on file.  Past Surgical History  No past surgical history on file.          10/15/24 0838   OT Last Visit   OT Visit Date 10/15/24   Note Type   Note type Evaluation   Pain Assessment   Pain Assessment Tool 0-10   Pain Score No Pain   Restrictions/Precautions   Weight Bearing Precautions Per Order No   Other Precautions Cognitive;Chair Alarm;Bed Alarm;Multiple lines;Fall Risk;Pain   Home Living   Type of Home Apartment   Home Layout One level;Elevator   Bathroom Shower/Tub Tub/shower unit   Bathroom Toilet Standard   Bathroom Equipment Grab bars in shower   Bathroom Accessibility Accessible   Home Equipment   (none)   Additional Comments no use of AD at baseline   Prior Function   Level of [place] Independent with ADLs   Lives With Son  (home during the day - can work from home)   Receives Help From Family   IADLs Family/Friend/Other provides transportation;Family/Friend/Other provides meals;Family/Friend/Other provides medication management  (son reports that he has been driving recently and handling the household chores)   Falls in the last 6 months 1 to 4   Vocational Retired   Lifestyle   Autonomy PTA pt living with son in apartment, pt (I) with ADLs and IADLs, (+)falls, (-)drives, no use of AD at baseline   Reciprocal Relationships supportive son will be home during the day   Service to Others retired   Intrinsic Gratification enjoys word searches   General   Additional Pertinent History Admit due to AMS, admitted on stroke pathway. Imaging (-) for  "acute intracranial abnormality. PMH: HTN, HLD, CKD, anemia   Family/Caregiver Present Yes  (son present at end of session)   Subjective   Subjective \"I don't know if that is right\" Pt stating about clock on [place]   ADL   Eating Assistance 7  Independent   Grooming Assistance 7  Independent   UB Bathing Assistance 5  Supervision/Setup   LB Bathing Assistance 5  Supervision/Setup   UB Dressing Assistance 5  Supervision/Setup   LB Dressing Assistance 5  Supervision/Setup   LB Dressing Deficit Increased time to complete;Supervision/safety;Verbal cueing;Thread RLE into underwear;Thread LLE into underwear;Pull up over hips   Toileting Assistance  5  Supervision/Setup   Bed Mobility   Supine to Sit 5  Supervision   Additional items Increased time required;Verbal cues   Transfers   Sit to Stand 5  Supervision   Additional items Increased time required;Verbal cues   Stand to Sit 5  Supervision   Additional items Increased time required;Verbal cues   Additional Comments no use of AD   Functional Mobility   Functional Mobility 5  Supervision   Additional Comments functional household distance   Additional items   (no AD)   Balance   Static Sitting Good   Dynamic Sitting Good   Static Standing Fair +   Dynamic Standing Fair   Ambulatory Fair   Activity Tolerance   Activity Tolerance Patient tolerated treatment well   Medical Staff Made Aware PT CLARY Parisi Assessment   RUE Assessment WFL   LUE Assessment   LUE Assessment WFL   Hand Function   Gross Motor Coordination Functional   Fine Motor Coordination Functional   Cognition   Overall Cognitive Status Impaired   Arousal/Participation Alert;Cooperative   Attention Attends with cues to redirect   Orientation Level Oriented to person;Oriented to time;Oriented to situation;Disoriented to place   Memory Decreased short term memory;Decreased recall of recent events;Decreased recall of precautions   Following Commands Follows one step commands with increased time or " repetition   Comments POOR recall - limited insights into deficits. Repetative in conversation. Limited attention to task   Cognition Assessment Tools (S)  MOCA  (see treatment note below)   Assessment   Limitation Decreased ADL status;Decreased Safe judgement during ADL;Decreased cognition;Decreased endurance;Decreased self-care trans;Decreased high-level ADLs  (impaired balance, fxnl mobility, act sadie, fxnl reach, standing sadie, strength, attention to task, direction following, safety awareness, insight, pacing, orientation, problem solving, learning new tasks, initiation, response time, flat affect)   Prognosis Good   Assessment Pt is a 79 y.o. female seen for OT evaluation s/p admission to [facility] on 10/11/2024 due to AMS (altered mental status). Personal and env factors supporting pt at time of IE include (I) PLOF, supportive son, accessible home environment, and FFSU. Personal and env factors inhibiting engagement in occupations include advanced age and difficulty completing IADLs. Performance deficits that affect the pt’s occupational performance can be seen above. Due to pt's current functional limitations and medical complications pt is functioning below baseline. Pt would benefit from continued skilled OT treatment in order to maximize safety, independence and overall performance with ADLs, functional mobility, functional transfers, and cognition in order to achieve highest level of function.   Goals   Patient Goals to feel better   LTG Time Frame 10-14   Long Term Goal see goals listed below   Plan   Treatment Interventions ADL retraining;Functional transfer training;Endurance training;Cognitive reorientation;Patient/family training;Equipment evaluation/education;Compensatory technique education;Activityengagement;Energy conservation   Goal Expiration Date 10/25/24   OT Treatment Day 1   OT Frequency 2-3x/wk   Discharge Recommendation   Rehab Resource Intensity Level, OT III (Minimum Resource Intensity)  (OP  "cognitive therapy + Fitness to Drive)   AM-PAC Daily Activity Inpatient   Lower Body Dressing 3   Bathing 3   Toileting 3   Upper Body Dressing 3   Grooming 4   Eating 4   Daily Activity Raw Score 20   Daily Activity Standardized Score (Calc for Raw Score >=11) 42.03   AM-PAC Applied Cognition Inpatient   Following a Speech/Presentation 2   Understanding Ordinary Conversation 3   Taking Medications 1   Remembering Where Things Are Placed or Put Away 2   Remembering List of 4-5 Errands 1   Taking Care of Complicated Tasks 1   Applied Cognition Raw Score 10   Applied Cognition Standardized Score 24.98   MOCA   Version 8.1   Visuopatial/Executive 1   Naming 1  (\"I think that's a cow\" \"I know thats not a giraffe but I don't know what it could be\")   Memory 0  (1st trial 3/5, 2nd trial 4/5)   Attention: Digits 2   Attention: Letters 1   Attention: Serial 0  (Pt stating \" I don't know what you want me to do\" Requiring x3 repetition of instructions and able to get 2-3 accurate subtractions. However scored as zero since requiring extensive repetition)   Language: Repeat 1   Language: Fluency 0  (5 words)   Abstraction 0  (states that orange and banana both have vitamins in them. States wheels and pedaling for train/bicycle and unable to state similarity for ruler/watch)   Delayed Recall 2  (MIS score: 7/15)   Orientation 3  (as pt is not from this area asked both city and state pt unable to recall either)   Does patient have less than or equal to 12 years of education? 0   MOCA Total Score 11   MOCA Comments score indicates MODERATE cognitive impairment. Pt demonstrating deficits in all aspects of cognition. Poor understanding of score and implications. Son at bedside reports understanding to attend OP cognitive therapy. Pt encouraged to continue participation in word searches, reading and puzzles to keep mind active. Pt and family verbalizing understanding   Additional Treatment Session   Start Time 0820   End Time 0838 "   Treatment Assessment see MoCA section above   Additional Treatment Day 1   End of Consult   Patient Position at End of Consult Bedside chair;Bed/Chair alarm activated;All needs within reach     GOALS:      -Patient will be Mod I with UB dressing using AE and AD as needed in order to increase (I) with ADLs    -Patient will be Mod I with UB bathing using AE and AD as needed in order to increase (I) with ADLs    -Patient will be Mod I with LB dressing with use of AE and AD as needed in order to increase (I) with ADLs    -Patient will be Mod I with LB bathing with use of AE and AD as needed in order to increase (I) with ADLs    -Patient will complete toileting w/ Mod I w/ G hygiene/thoroughness in order to reduce caregiver burden    -Patient will demonstrate Mod I with bed mobility for ability to manage own comfort and initiate OOB tasks.     -Patient will perform functional transfers with Mod I to/from all surfaces using DME as needed in order to increase (I) with functional tasks    -Patient will be Mod I with functional mobility to/from bathroom for increased independence with toileting tasks    -Patient will engage in ongoing cognitive assessment in order to assist with safe discharge planning/recommendations.    -Patient will follow multi-step instructions with no VC in order to safely complete functional tasks     -Patient will attend to functional task for 10 min without VC for attention/redirection         The patient's raw score on the -PAC Daily Activity Inpatient Short Form is 20. A raw score of greater than or equal to 19 suggests the patient may benefit from discharge to home. HOWEVER please refer to the recommendation of the Occupational Therapist for safe discharge planning.    This session, pt required and most appropriately benefited from skilled OT/PT co-eval due to unpredictable medical and/or functional status. OT and PT goals were addressed separately as seen in documentation.     Rebecca  MS Dayna, OTR/L

## 2024-10-15 NOTE — ASSESSMENT & PLAN NOTE
Lab Results   Component Value Date    [id number] 209 10/11/2024     Vitamin B12 levels low at 2 9, patient is a candidate for IM B12 supplementation  1000 mcg daily x 5 days followed by 1000 mcg p.o. daily  Follow-up with PCP as outpatient

## 2024-10-15 NOTE — ASSESSMENT & PLAN NOTE
Lab Results   Component Value Date    EGFR 49 10/15/2024    EGFR 45 10/14/2024    EGFR 42 10/13/2024    CREATININE 1.07 10/15/2024    CREATININE 1.15 10/14/2024    CREATININE 1.21 10/13/2024   Avoid hypotension and nephrotoxic medications  Continue to monitor daily renal function  Continue with gentle IV fluid hydration with normal saline 50 cc/h x 24 hours

## 2024-10-15 NOTE — ASSESSMENT & PLAN NOTE
Ongoing dizziness likely 2/2 deconditioning as well as poor PO intake  Orthostatics on admission was negative  Recommended Meclizine, however patient declined  Will recheck orthostatics prior to discharge

## 2024-10-16 VITALS
SYSTOLIC BLOOD PRESSURE: 159 MMHG | WEIGHT: 126.54 LBS | TEMPERATURE: 98.1 F | RESPIRATION RATE: 20 BRPM | HEIGHT: 61 IN | OXYGEN SATURATION: 98 % | BODY MASS INDEX: 23.89 KG/M2 | DIASTOLIC BLOOD PRESSURE: 82 MMHG | HEART RATE: 72 BPM

## 2024-10-16 PROBLEM — G92.8 TOXIC METABOLIC ENCEPHALOPATHY: Status: RESOLVED | Noted: 2024-10-11 | Resolved: 2024-10-16

## 2024-10-16 PROBLEM — R05.9 COUGH: Status: RESOLVED | Noted: 2024-10-11 | Resolved: 2024-10-16

## 2024-10-16 PROBLEM — G92.8 TOXIC METABOLIC ENCEPHALOPATHY: Status: ACTIVE | Noted: 2024-10-11

## 2024-10-16 PROBLEM — R41.82 AMS (ALTERED MENTAL STATUS): Status: RESOLVED | Noted: 2024-10-11 | Resolved: 2024-10-16

## 2024-10-16 PROBLEM — D72.829 LEUKOCYTOSIS: Status: RESOLVED | Noted: 2024-10-12 | Resolved: 2024-10-16

## 2024-10-16 PROBLEM — N17.9 AKI (ACUTE KIDNEY INJURY) (HCC): Status: RESOLVED | Noted: 2024-10-12 | Resolved: 2024-10-16

## 2024-10-16 LAB
ANION GAP SERPL CALCULATED.3IONS-SCNC: 6 MMOL/L (ref 4–13)
BUN SERPL-MCNC: 25 MG/DL (ref 5–25)
CALCIUM SERPL-MCNC: 8.7 MG/DL (ref 8.4–10.2)
CHLORIDE SERPL-SCNC: 100 MMOL/L (ref 96–108)
CO2 SERPL-SCNC: 26 MMOL/L (ref 21–32)
CREAT SERPL-MCNC: 1.06 MG/DL (ref 0.6–1.3)
GFR SERPL CREATININE-BSD FRML MDRD: 49 ML/MIN/1.73SQ M
GLUCOSE SERPL-MCNC: 90 MG/DL (ref 65–140)
MAGNESIUM SERPL-MCNC: 1.9 MG/DL (ref 1.9–2.7)
POTASSIUM SERPL-SCNC: 4.2 MMOL/L (ref 3.5–5.3)
SODIUM SERPL-SCNC: 132 MMOL/L (ref 135–147)

## 2024-10-16 PROCEDURE — 83735 ASSAY OF MAGNESIUM: CPT

## 2024-10-16 PROCEDURE — 80048 BASIC METABOLIC PNL TOTAL CA: CPT

## 2024-10-16 PROCEDURE — 99239 HOSP IP/OBS DSCHRG MGMT >30: CPT | Performed by: INTERNAL MEDICINE

## 2024-10-16 RX ORDER — AMLODIPINE BESYLATE 5 MG/1
5 TABLET ORAL DAILY
Status: CANCELLED | OUTPATIENT
Start: 2024-10-16

## 2024-10-16 RX ORDER — ATENOLOL 25 MG/1
25 TABLET ORAL DAILY
Qty: 30 TABLET | Refills: 0 | Status: SHIPPED | OUTPATIENT
Start: 2024-10-16

## 2024-10-16 RX ORDER — AMLODIPINE BESYLATE 5 MG/1
5 TABLET ORAL DAILY
Qty: 30 TABLET | Refills: 0 | Status: SHIPPED | OUTPATIENT
Start: 2024-10-16 | End: 2024-11-15

## 2024-10-16 RX ADMIN — CYANOCOBALAMIN TAB 500 MCG 1000 MCG: 500 TAB at 08:19

## 2024-10-16 NOTE — ASSESSMENT & PLAN NOTE
POA with confusion, delirium starting this am, last well known yesterday as per son  Denies any sx of URI, c/o of dysuria, chest pain, fever, chills or any other complains  Pt's so admits mild cough yesterday with no fever  POA patient was disoriented, delirious, restless and agitated, unable to answer questions or to provide history.   TSH elevated at 5.3, but T4 normal.  Normal UA  Normal coma panel, normal ammonia  CTH and CTA h&n showed no acute bleeding or significant occlusion  S/p  cc bolus in ED, load dose of asa 325 and plavix 300, ativan 0.5 for agitation  Mentation significantly improved, AAO x 2.  Mental status baseline confirmed at bedside with son  Venous complex lower extremities negative for acute or chronic DVT bilaterally  Echo LVEF 65%, grade I (abnormal) relaxation  No concerns for stroke or TIA.  Per neurology, encephalopathy likely due to TME versus seizure activity due to severe hyponatremia  Currently back to baseline Aox3    Plan:  Stable for discharge  Fall precautions

## 2024-10-16 NOTE — ASSESSMENT & PLAN NOTE
Lab Results   Component Value Date    EGFR 49 10/16/2024    EGFR 49 10/15/2024    EGFR 45 10/14/2024    CREATININE 1.06 10/16/2024    CREATININE 1.07 10/15/2024    CREATININE 1.15 10/14/2024   Avoid hypotension and nephrotoxic medications  Continue to monitor daily renal function  Continue with gentle IV fluid hydration with normal saline 50 cc/h x 24 hours

## 2024-10-16 NOTE — ASSESSMENT & PLAN NOTE
History confirmed with PCP office  PTA meds atenolol 25 mg daily  Pt was hypertensive on admission with , improved without intervention to , possibly agitation contributing, allow permissive hypertension for the next 24 h  Trialed HCTZ, however might contribute negatively to Na and was discontinued after one dose.  Continue atenolol  Start taking amlodipine 5 mg daily; side effects of lower extremity edema discussed with patient.  Son and patient agreeable  Monitor BP readings at home and keep a BP log. F/u with PCP

## 2024-10-16 NOTE — ASSESSMENT & PLAN NOTE
POA with HGB 10.4, normocytic, RDW within normal range  Reported black stools by pt's son  H/o of CKD 3  LDH normal, folate normal, iron panel within normal limits  Stable  Follow-up patient with PCP

## 2024-10-16 NOTE — ASSESSMENT & PLAN NOTE
Leukocytosis of 12.5 with neutrophils and segmented  Unknown source  Chest x-ray unremarkable, UA normal  Received IV Rocephin x 3 days

## 2024-10-16 NOTE — ASSESSMENT & PLAN NOTE
History of cough x 1 day, afebrile  Chest x-ray unremarkable.  COVID/flu/RSV negative  Procalcitonin negative  Leukocytosis of 12.5, however normalized  CT C/A/P showed no acute findings or evidence for malignancy   Treated empirically with Rocephin x3  Monitor off antibiotic; stable

## 2024-10-16 NOTE — PLAN OF CARE
Problem: Prexisting or High Potential for Compromised Skin Integrity  Goal: Skin integrity is maintained or improved  Description: INTERVENTIONS:  - Identify patients at risk for skin breakdown  - Assess and monitor skin integrity  - Assess and monitor nutrition and hydration status  - Monitor labs   - Assess for incontinence   - Turn and reposition patient  - Assist with mobility/ambulation  - Relieve pressure over bony prominences  - Avoid friction and shearing  - Provide appropriate hygiene as needed including keeping skin clean and dry  - Evaluate need for skin moisturizer/barrier cream  - Collaborate with interdisciplinary team   - Patient/family teaching  - Consider wound care consult   Outcome: Progressing     Problem: PAIN - ADULT  Goal: Verbalizes/displays adequate comfort level or baseline comfort level  Description: Interventions:  - Encourage patient to monitor pain and request assistance  - Assess pain using appropriate pain scale  - Administer analgesics based on type and severity of pain and evaluate response  - Implement non-pharmacological measures as appropriate and evaluate response  - Consider cultural and social influences on pain and pain management  - Notify physician/advanced practitioner if interventions unsuccessful or patient reports new pain  Outcome: Progressing     Problem: INFECTION - ADULT  Goal: Absence or prevention of progression during hospitalization  Description: INTERVENTIONS:  - Assess and monitor for signs and symptoms of infection  - Monitor lab/diagnostic results  - Monitor all insertion sites, i.e. indwelling lines, tubes, and drains  - Monitor endotracheal if appropriate and nasal secretions for changes in amount and color  - [place] appropriate cooling/warming therapies per order  - Administer medications as ordered  - Instruct and encourage patient and family to use good hand hygiene technique  - Identify and instruct in appropriate isolation precautions for  identified infection/condition  Outcome: Progressing  Goal: Absence of fever/infection during neutropenic period  Description: INTERVENTIONS:  - Monitor WBC    Outcome: Progressing     Problem: SAFETY ADULT  Goal: Patient will remain free of falls  Description: INTERVENTIONS:  - Educate patient/family on patient safety including physical limitations  - Instruct patient to call for assistance with activity   - Consult OT/PT to assist with strengthening/mobility   - Keep Call bell within reach  - Keep bed low and locked with side rails adjusted as appropriate  - Keep care items and personal belongings within reach  - Initiate and maintain comfort rounds  - Make Fall Risk Sign visible to staff  - Offer Toileting every 2 Hours, in advance of need  - Initiate/Maintain alarm  - Obtain necessary fall risk management equipment:   - Apply yellow socks and bracelet for high fall risk patients  - Consider moving patient to room near nurses station  Outcome: Progressing  Goal: Maintain or return to baseline ADL function  Description: INTERVENTIONS:  -  Assess patient's ability to carry out ADLs; assess patient's baseline for ADL function and identify physical deficits which impact ability to perform ADLs (bathing, care of mouth/teeth, toileting, grooming, dressing, etc.)  - Assess/evaluate cause of self-care deficits   - Assess range of motion  - Assess patient's mobility; develop plan if impaired  - Assess patient's need for assistive devices and provide as appropriate  - Encourage maximum independence but intervene and supervise when necessary  - Involve family in performance of ADLs  - Assess for home care needs following discharge   - Consider OT consult to assist with ADL evaluation and planning for discharge  - Provide patient education as appropriate  Outcome: Progressing  Goal: Maintains/Returns to pre admission functional level  Description: INTERVENTIONS:  - Perform AM-PAC 6 Click Basic Mobility/ Daily Activity  assessment daily.  - Set and communicate daily mobility goal to care team and patient/family/caregiver.   - Collaborate with rehabilitation services on mobility goals if consulted  - Perform Range of Motion 2 times a day.  - Reposition patient every 2 hours.  - Dangle patient 2 times a day  - Stand patient 2 times a day  - Ambulate patient 2 times a day  - Out of bed to chair 2 times a day   - Out of bed for meals 2 times a day  - Out of bed for toileting  - Record patient progress and toleration of activity level   Outcome: Progressing     Problem: DISCHARGE PLANNING  Goal: Discharge to home or other facility with appropriate resources  Description: INTERVENTIONS:  - Identify barriers to discharge w/patient and caregiver  - Arrange for needed discharge resources and transportation as appropriate  - Identify discharge learning needs (meds, wound care, etc.)  - Arrange for interpretive services to assist at discharge as needed  - Refer to Case Management Department for coordinating discharge planning if the patient needs post-hospital services based on physician/advanced practitioner order or complex needs related to functional status, cognitive ability, or social support system  Outcome: Progressing     Problem: Knowledge Deficit  Goal: Patient/family/caregiver demonstrates understanding of disease process, treatment plan, medications, and discharge instructions  Description: Complete learning assessment and assess knowledge base.  Interventions:  - Provide teaching at level of understanding  - Provide teaching via preferred learning methods  Outcome: Progressing

## 2024-10-16 NOTE — ASSESSMENT & PLAN NOTE
POA with confusion and sodium 126  Similar presentation in 2021 as per pt's son, mentation improved at that time with sodium normalization  H/o of fall about 2.5 weeks ago, pt had mild pain since then and was taking tylenol, denies NSAIDs  Hypovolemic on exam  Serum osmolality normal at 286  Recent Labs     10/14/24  0432 10/15/24  0504 10/16/24  0503   SODIUM 136 132* 132*     Sodium stabilized and improving    Plan:  Encourage p.o. intake and hydration  Repeat BMP in 1 week

## 2024-10-16 NOTE — DISCHARGE SUMMARY
Discharge Summary - Hospitalist   Name: Ashlee Bermeo 79 y.o. female I MRN: [id number]  Unit/Bed#: S -01 I Date of Admission: 10/11/2024   Date of Service: 10/16/2024 I Hospital Day: 5     Assessment & Plan  Toxic metabolic encephalopathy (Resolved: 10/16/2024)  POA with confusion, delirium starting this am, last well known yesterday as per son  Denies any sx of URI, c/o of dysuria, chest pain, fever, chills or any other complains  Pt's so admits mild cough yesterday with no fever  POA patient was disoriented, delirious, restless and agitated, unable to answer questions or to provide history.   TSH elevated at 5.3, but T4 normal.  Normal UA  Normal coma panel, normal ammonia  CTH and CTA h&n showed no acute bleeding or significant occlusion  S/p  cc bolus in ED, load dose of asa 325 and plavix 300, ativan 0.5 for agitation  Mentation significantly improved, AAO x 2.  Mental status baseline confirmed at bedside with son  Venous complex lower extremities negative for acute or chronic DVT bilaterally  Echo LVEF 65%, grade I (abnormal) relaxation  No concerns for stroke or TIA.  Per neurology, encephalopathy likely due to TME versus seizure activity due to severe hyponatremia  Currently back to baseline Aox3    Plan:  Stable for discharge  Fall precautions    Hyponatremia  POA with confusion and sodium 126  Similar presentation in 2021 as per pt's son, mentation improved at that time with sodium normalization  H/o of fall about 2.5 weeks ago, pt had mild pain since then and was taking tylenol, denies NSAIDs  Hypovolemic on exam  Serum osmolality normal at 286  Recent Labs     10/14/24  0432 10/15/24  0504 10/16/24  0503   SODIUM 136 132* 132*     Sodium stabilized and improving    Plan:  Encourage p.o. intake and hydration  Repeat BMP in 1 week    DEMI (acute kidney injury) (HCC) (Resolved: 10/16/2024)  Creatinine baseline ~ 1.1-.13   Increased creatinine to 1.51 -> 1.41  Likely prerenal in setting of  hypovolemia on exam and poor p.o. intake  Renal function improved and normalized  Leukocytosis (Resolved: 10/16/2024)  Leukocytosis of 12.5 with neutrophils and segmented  Unknown source  Chest x-ray unremarkable, UA normal  Received IV Rocephin x 3 days    Anemia  POA with HGB 10.4, normocytic, RDW within normal range  Reported black stools by pt's son  H/o of CKD 3  LDH normal, folate normal, iron panel within normal limits  Stable  Follow-up patient with PCP    Dizziness  Ongoing dizziness likely 2/2 deconditioning as well as poor PO intake  Orthostatics on admission and repeat on discharge negative  Recommended Meclizine, however patient declined  Fall precautions discussed with patient  PT/OT outpatient  Follow-up patient with PCP  HTN (hypertension)  History confirmed with PCP office  PTA meds atenolol 25 mg daily  Pt was hypertensive on admission with , improved without intervention to , possibly agitation contributing, allow permissive hypertension for the next 24 h  Trialed HCTZ, however might contribute negatively to Na and was discontinued after one dose.  Continue atenolol  Start taking amlodipine 5 mg daily; side effects of lower extremity edema discussed with patient.  Son and patient agreeable  Monitor BP readings at home and keep a BP log. F/u with PCP  HLD (hyperlipidemia)  As per PCP office pt is not on statin  , cholesterol 168.   Patient and son declined statin  Hypothyroid  Not taking levothyroxine as per PCP office  TSH is slightly elevated to 5.3  Normal free T4  CKD (chronic kidney disease)  Lab Results   Component Value Date    EGFR 49 10/16/2024    EGFR 49 10/15/2024    EGFR 45 10/14/2024    CREATININE 1.06 10/16/2024    CREATININE 1.07 10/15/2024    CREATININE 1.15 10/14/2024   Avoid hypotension and nephrotoxic medications  Continue to monitor daily renal function  Continue with gentle IV fluid hydration with normal saline 50 cc/h x 24 hours  H/O fall  Pt's son  "reported fall about 2.5 weeks ago on her \"knees and hands\", managed with tylenol  Pt was complaining of R shoulder and right lateral chest wall pain (possibly ribs)  Xray chest was unremarkable  Right knee x-ray negative  Tylenol for pain prn  Voltaren gel and lidocaine patch prn  Orthostatics negative  PT/OT outpatient    Cough (Resolved: 10/16/2024)  History of cough x 1 day, afebrile  Chest x-ray unremarkable.  COVID/flu/RSV negative  Procalcitonin negative  Leukocytosis of 12.5, however normalized  CT C/A/P showed no acute findings or evidence for malignancy   Treated empirically with Rocephin x3  Monitor off antibiotic; stable    B12 deficiency  Lab Results   Component Value Date    [id number] 209 10/11/2024     Vitamin B12 levels low at 2 9, patient is a candidate for IM B12 supplementation  1000 mcg daily x 5 days followed by 1000 mcg p.o. daily  Follow-up with PCP as outpatient     Medical Problems       Resolved Problems  Never Reviewed            Resolved    * (Principal) Toxic metabolic encephalopathy 10/16/2024     Resolved by  Angelica Ayon MD        Discharging Physician / Practitioner: Angelica Ayon MD  PCP: No primary care provider on file.  Admission Date:   Admission Orders (From admission, onward)       Ordered        10/11/24 1446  INPATIENT ADMISSION  Once                          Discharge Date: 10/16/24    Consultations During Hospital Stay:  Neurology    Procedures Performed:   N/A    Significant Findings / Test Results:   CT chest abdomen pelvis wo contrast  Impression: No acute findings or evidence for malignancy in the chest, abdomen, or pelvis.  XR knee 4+ vw right injury  Impression: No acute osseous abnormality.   XR shoulder 2+ vw right  Impression: No acute osseous abnormality.   XR chest 1 view portable  Impression: No acute cardiopulmonary disease. Workstation performed: [id number]   CT stroke alert brain  Impression: No acute intracranial abnormality.   CTA " stroke alert (head/neck) 10/11/2024  Impression: No significant stenosis of the cervical carotid or vertebral arteries No significant intracranial stenosis, large vessel occlusion or aneurysm.    Incidental Findings:   None    Test Results Pending at Discharge (will require follow up):   None     Outpatient Tests Requested:  BMP in 1 week    Complications: None    Reason for Admission: Encephalopathy due to hyponatremia    Hospital Course:   Ashlee Bermeo is a 79 y.o. female patient who originally presented to the hospital on 10/11/2024 due to altered mental status.  Neurology was consulted patient was admitted under the stroke pathway.  CTA, CT brain were negative for any acute findings.  Severe hyponatremia was noted on admission for which resolved after IV fluids and increased p.o. intake.  On admission patient also had an DEMI which resolved after IV fluids.  Once hyponatremia resolved, mentation improved significantly and patient was back to baseline with no focal deficits.  Due to mentation improvement, MRI brain was not needed per neurology recommendations.    Shoulder right x-ray and right knee x-ray were performed due to fall last week; findings were unremarkable.  Patient was treated empirically with IV antibiotics for presumed URI; however was stable after discontinuing antibiotics.    Patient experienced ongoing dizziness, however declined any medications including meclizine.  PT evaluated patient and outpatient PT was recommended.  Orthostatics were negative.  Blood pressures fluctuated throughout her hospital stay, amlodipine 5 mg was recommended.  Patient remained hemodynamically stable and was appropriate for discharge.  Patient will need to follow-up outpatient with PCP in 1 week.  Repeat BMP in 1 week as well.    Please see above list of diagnoses and related plan for additional information.     Condition at Discharge: stable    Discharge Day Visit / Exam:   Subjective: Seen and evaluated at bedside.  " No overnight events.  Patient reports dizziness has improved.  Vitals: Blood Pressure: 152/69 (10/16/24 0854)  Pulse: 63 (10/16/24 0854)  Temperature: 98.1 °F (36.7 °C) (10/16/24 0737)  Temp Source: Oral (10/14/24 0729)  Respirations: 20 (10/15/24 1315)  Height: 5' 1\" (154.9 cm) (10/12/24 0730)  Weight - Scale: 57.4 kg (126 lb 8.7 oz) (10/13/24 0100)  SpO2: 100 % (10/16/24 0854)  Physical Exam  Constitutional:       Appearance: Normal appearance.   Cardiovascular:      Rate and Rhythm: Normal rate and regular rhythm.      Pulses: Normal pulses.      Heart sounds: Normal heart sounds.   Pulmonary:      Effort: Pulmonary effort is normal.      Breath sounds: Normal breath sounds.   Abdominal:      General: Bowel sounds are normal.      Palpations: Abdomen is soft.   Musculoskeletal:      Right lower leg: No edema.      Left lower leg: No edema.   Skin:     General: Skin is warm.      Capillary Refill: Capillary refill takes less than 2 seconds.   Neurological:      General: No focal deficit present.      Mental Status: She is alert and oriented to person, place, and time.          Discussion with Family: Updated  (son) at bedside.    Discharge instructions/Information to patient and family:   See after visit summary for information provided to patient and family.      Provisions for Follow-Up Care:  See after visit summary for information related to follow-up care and any pertinent home health orders.      Mobility at time of Discharge:   Basic Mobility Inpatient Raw Score: 19  JH-HLM Goal: 6: Walk 10 steps or more  JH-HLM Achieved: 7: Walk 25 feet or more  HLM Goal achieved. Continue to encourage appropriate mobility.     Disposition:   Home    Planned Readmission: none     Discharge Medications:  See after visit summary for reconciled discharge medications provided to patient and/or family.      Administrative Statements   Discharge Statement:  I have spent a total time of 35 minutes in caring for " this patient on the day of the visit/encounter. .    **Please Note: This note may have been constructed using a voice recognition system**

## 2024-10-16 NOTE — ASSESSMENT & PLAN NOTE
"Pt's son reported fall about 2.5 weeks ago on her \"knees and hands\", managed with tylenol  Pt was complaining of R shoulder and right lateral chest wall pain (possibly ribs)  Xray chest was unremarkable  Right knee x-ray negative  Tylenol for pain prn  Voltaren gel and lidocaine patch prn  Orthostatics negative  PT/OT outpatient    "

## 2024-10-16 NOTE — DISCHARGE INSTR - AVS FIRST PAGE
Dear Ashlee Bermeo,     [facility]  It is our hope that we were always able to exceed the expected standards for your care during your stay.  You were hospitalized due to encephalopathy due to hyponatremia.  You were cared for on the medical floor by Angelica Ayon MD under the service of Omayra Arce MD with the [facility] who covers for your primary care physician (PCP), No primary care provider on file., while you were hospitalized.  If you have any questions or concerns related to this hospitalization, you may contact us at .  For follow up as well as any medication refills, we recommend that you follow up with your primary care physician.  A registered nurse will reach out to you by phone within a few days after your discharge to answer any additional questions that you may have after going home.  However, at this time we provide for you here, the most important instructions / recommendations at discharge:     Notable Medication Adjustments -   Start taking amlodipine 5 mg daily  Take vitamin B12 1000 mcg daily  Testing Required after Discharge -   BMP in 1 week  ** Please contact your PCP to request testing orders for any of the testing recommended here **  Important follow up information -   Please follow-up with your PCP within a week of discharge.  Other Instructions -   Keep hydrated and increase salt intake moderately.  Keep a blood pressure reading log book daily and discussed with your PCP.  Please review this entire after visit summary as additional general instructions including medication list, appointments, activity, diet, any pertinent wound care, and other additional recommendations from your care team that may be provided for you.      Sincerely,     Angelica Ayon MD

## 2024-10-16 NOTE — ASSESSMENT & PLAN NOTE
Lab Results   Component Value Date    [id number] 209 10/11/2024     Vitamin B12 levels low at 2 9, patient is a candidate for IM B12 supplementation  1000 mcg daily x 5 days followed by 1000 mcg p.o. daily  Follow-up with PCP as outpatient

## 2024-10-16 NOTE — ASSESSMENT & PLAN NOTE
Ongoing dizziness likely 2/2 deconditioning as well as poor PO intake  Orthostatics on admission and repeat on discharge negative  Recommended Meclizine, however patient declined  Fall precautions discussed with patient  PT/OT outpatient  Follow-up patient with PCP

## 2024-10-17 LAB
BACTERIA BLD CULT: NORMAL
BACTERIA BLD CULT: NORMAL

## 2024-10-28 ENCOUNTER — TRANSCRIBE ORDERS (OUTPATIENT)
Dept: LAB | Facility: CLINIC | Age: 79
End: 2024-10-28

## 2024-10-28 DIAGNOSIS — D64.9 ANEMIA, UNSPECIFIED TYPE: Primary | ICD-10-CM

## 2024-10-28 DIAGNOSIS — E87.1 HYPONATREMIA: ICD-10-CM

## 2025-01-27 ENCOUNTER — APPOINTMENT (EMERGENCY)
Dept: CT IMAGING | Facility: HOSPITAL | Age: 80
End: 2025-01-27
Payer: MEDICARE

## 2025-01-27 ENCOUNTER — HOSPITAL ENCOUNTER (EMERGENCY)
Facility: HOSPITAL | Age: 80
Discharge: HOME | End: 2025-01-27
Attending: EMERGENCY MEDICINE | Admitting: EMERGENCY MEDICINE
Payer: MEDICARE

## 2025-01-27 VITALS
HEART RATE: 56 BPM | SYSTOLIC BLOOD PRESSURE: 170 MMHG | BODY MASS INDEX: 23.92 KG/M2 | RESPIRATION RATE: 16 BRPM | WEIGHT: 130 LBS | HEIGHT: 62 IN | DIASTOLIC BLOOD PRESSURE: 71 MMHG | TEMPERATURE: 97.9 F | OXYGEN SATURATION: 99 %

## 2025-01-27 DIAGNOSIS — S32.009A CLOSED FRACTURE OF TRANSVERSE PROCESS OF LUMBAR VERTEBRA, INITIAL ENCOUNTER (HCC): ICD-10-CM
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
## 2025-01-27 LAB
ANION GAP SERPL CALCULATED.3IONS-SCNC: 6 MMOL/L (ref 4–13)
BASOPHILS # BLD AUTO: 0.02 THOUSANDS/ΜL (ref 0–0.1)
BASOPHILS NFR BLD AUTO: 0 % (ref 0–1)
BUN SERPL-MCNC: 35 MG/DL (ref 5–25)
CALCIUM SERPL-MCNC: 8.6 MG/DL (ref 8.4–10.2)
CHLORIDE SERPL-SCNC: 99 MMOL/L (ref 96–108)
CO2 SERPL-SCNC: 27 MMOL/L (ref 21–32)
CREAT SERPL-MCNC: 1.21 MG/DL (ref 0.6–1.3)
EOSINOPHIL # BLD AUTO: 0.12 THOUSAND/ΜL (ref 0–0.61)
EOSINOPHIL NFR BLD AUTO: 2 % (ref 0–6)
ERYTHROCYTE [DISTWIDTH] IN BLOOD BY AUTOMATED COUNT: 12.9 % (ref 11.6–15.1)
GFR SERPL CREATININE-BSD FRML MDRD: 42 ML/MIN/1.73SQ M
GLUCOSE SERPL-MCNC: 89 MG/DL (ref 65–140)
HCT VFR BLD AUTO: 30.6 % (ref 34.8–46.1)
HGB BLD-MCNC: 10.4 G/DL (ref 11.5–15.4)
IMM GRANULOCYTES # BLD AUTO: 0.04 THOUSAND/UL (ref 0–0.2)
IMM GRANULOCYTES NFR BLD AUTO: 1 % (ref 0–2)
LYMPHOCYTES # BLD AUTO: 1.27 THOUSANDS/ΜL (ref 0.6–4.47)
LYMPHOCYTES NFR BLD AUTO: 17 % (ref 14–44)
MCH RBC QN AUTO: 29.2 PG (ref 26.8–34.3)
MCHC RBC AUTO-ENTMCNC: 34 G/DL (ref 31.4–37.4)
MCV RBC AUTO: 86 FL (ref 82–98)
MONOCYTES # BLD AUTO: 0.5 THOUSAND/ΜL (ref 0.17–1.22)
MONOCYTES NFR BLD AUTO: 7 % (ref 4–12)
NEUTROPHILS # BLD AUTO: 5.43 THOUSANDS/ΜL (ref 1.85–7.62)
NEUTS SEG NFR BLD AUTO: 73 % (ref 43–75)
NRBC BLD AUTO-RTO: 0 /100 WBCS
PLATELET # BLD AUTO: 236 THOUSANDS/UL (ref 149–390)
PMV BLD AUTO: 8.7 FL (ref 8.9–12.7)
POTASSIUM SERPL-SCNC: 4.3 MMOL/L (ref 3.5–5.3)
RBC # BLD AUTO: 3.56 MILLION/UL (ref 3.81–5.12)
SODIUM SERPL-SCNC: 132 MMOL/L (ref 135–147)
WBC # BLD AUTO: 7.38 THOUSAND/UL (ref 4.31–10.16)

## 2025-01-27 PROCEDURE — 72131 CT LUMBAR SPINE W/O DYE: CPT

## 2025-01-27 PROCEDURE — 36415 COLL VENOUS BLD VENIPUNCTURE: CPT | Performed by: EMERGENCY MEDICINE

## 2025-01-27 PROCEDURE — 99284 EMERGENCY DEPT VISIT MOD MDM: CPT | Performed by: EMERGENCY MEDICINE

## 2025-01-27 PROCEDURE — 85025 COMPLETE CBC W/AUTO DIFF WBC: CPT | Performed by: EMERGENCY MEDICINE

## 2025-01-27 PROCEDURE — 80048 BASIC METABOLIC PNL TOTAL CA: CPT | Performed by: EMERGENCY MEDICINE

## 2025-01-27 PROCEDURE — 99284 EMERGENCY DEPT VISIT MOD MDM: CPT

## 2025-01-28 ENCOUNTER — TELEPHONE (OUTPATIENT)
Dept: PHYSICAL THERAPY | Facility: OTHER | Age: 80
End: 2025-01-28

## 2025-01-28 NOTE — ED PROVIDER NOTES
Time reflects when diagnosis was documented in both MDM as applicable and the Disposition within this note       Time User Action Codes Description Comment    1/27/2025  9:10 PM Sharonda Maddox Add [W19.XXXA] Fall, initial encounter     1/27/2025  9:11 PM Sharonda Maddox Add [S32.009A] Closed fracture of transverse process of lumbar vertebra, initial encounter ([facility])     1/27/2025  9:11 PM Sharonda Maddox Modify [S32.009A] Closed fracture of transverse process of lumbar vertebra, initial encounter ([facility]) L3, L4          ED Disposition       ED Disposition   Discharge    Condition   Stable    Date/Time   Mon Jan 27, 2025  9:10 PM    Comment   Ashlee Bermeo discharge to home/self care.                   Assessment & Plan       Medical Decision Making  Amount and/or Complexity of Data Reviewed  Labs: ordered.  Radiology: ordered.      Patient is neurologically intact without any focal deficits.  GCS 15.  No red flag signs for back pain.  I had a long discussion about obtaining CT head and cervical spine since patient did fall and hit her head, patient's primary concern is for low back pain and states she wants to limit radiation from CAT scan as much as she can, I did discuss risks and benefits, however after shared decision making both patient and son would defer CT cervical spine and head at this time.  They are both able to make medical decision, and are of sound mind.  They verbalized understanding of the risks and benefits.    CT lumbar spine results as below.  Patient and son updated with results.  Son mentions that last time patient fell she was found to be severely hyponatremic, therefore will check blood work.    Sodium 132 which is similar to prior.  He mentioned that in October is when she had issues with hyponatremia, upon review of the charts, it appeared to be 126 at that time.  I offered sodium supplementation in the emergency department however they would like to do it with diet at home.  I think this is  reasonable.  She is currently with stable gait, dizzy.  Patient was referred to compresses spine program, also advised to reach out to pain management.  They were updated with all results.  Return precautions were discussed, patient and son verbalized understanding and are in agreement with plan.         Medications - No data to display    ED Risk Strat Scores                                  TLICS Criteria      Flowsheet Row Most Recent Value   Thoracolumbar Injury Classification & Severity Scale    Morphology 0 Filed at: 01/27/2025 2027   Neurologic involvement 0 Filed at: 01/27/2025 2027   Posterior ligament complex 0 Filed at: 01/27/2025 2027   TLICS Score 0 Filed at: 01/27/2025 2027                    History of Present Illness       Chief Complaint   Patient presents with    Back Pain     Pt c/o right sided lower back pain after slipping and falling in the shower Friday evening. Pt reports hitting her head, possible LOC, vomited 1 time after. Denies thinners/aspirin.       Past Medical History:   Diagnosis Date    Hypertension     Hyponatremia       Past Surgical History:   Procedure Laterality Date    SHOULDER SURGERY Right       History reviewed. No pertinent family history.   Social History     Tobacco Use    Smoking status: Never    Smokeless tobacco: Never   Vaping Use    Vaping status: Never Used   Substance Use Topics    Alcohol use: Never    Drug use: Never      E-Cigarette/Vaping    E-Cigarette Use Never User       E-Cigarette/Vaping Substances    Nicotine No     THC No     CBD No     Flavoring No     Other No     Unknown No       I have reviewed and agree with the history as documented.     79-year-old female presenting to the emergency department after a fall.  Son is at bedside with patient.  She was in the bathtub 3 days ago when she slipped and fell out of the bathtub and hit the back of her head.  Son states she did pass out for few seconds, did vomit afterwards.  However he states this happens  to her with any sort of trauma and has happened before as well.  She only complains of low back pain.  Denies any pain elsewhere.  No numbness or weakness or visual changes.  No headache.  No neck pain or upper back pain.  She has been ambulatory as at baseline.  She is not on any blood thinners.  No further vomiting since the fall.  Per son she is at her mental baseline.        Review of Systems   Constitutional:  Negative for chills and fever.   Respiratory:  Negative for cough and shortness of breath.    Cardiovascular:  Negative for chest pain.   Gastrointestinal:  Negative for abdominal pain.   Musculoskeletal:  Positive for back pain. Negative for neck pain and neck stiffness.   Skin:  Negative for color change and wound.   Neurological:  Negative for seizures and headaches.           Objective       ED Triage Vitals [01/27/25 1938]   Temperature Pulse Blood Pressure Respirations SpO2 Patient Position - Orthostatic VS   97.9 °F (36.6 °C) 68 (!) 200/82 18 100 % --      Temp Source Heart Rate Source BP Location FiO2 (%) Pain Score    Temporal Monitor Left arm -- 4      Vitals      Date and Time Temp Pulse SpO2 Resp BP Pain Score FACES Pain Rating User   01/27/25 2100 -- 56 99 % 16 170/71 -- -- BY   01/27/25 2045 -- 55 98 % 16 145/73 -- -- BY   01/27/25 2030 -- 58 97 % 16 159/73 -- -- BY   01/27/25 2015 -- 59 98 % 16 178/77 -- -- BY   01/27/25 2000 -- 62 99 % 16 201/87 -- -- BY   01/27/25 1947 97.9 °F (36.6 °C) 68 100 % 18 200/82 -- -- AD   01/27/25 1938 97.9 °F (36.6 °C) 68 100 % 18 200/82 4 -- AD            Physical Exam  Constitutional:       General: She is not in acute distress.     Appearance: She is not ill-appearing.   HENT:      Head: Normocephalic and atraumatic.      Nose: Nose normal.      Mouth/Throat:      Mouth: Mucous membranes are moist.   Eyes:      Extraocular Movements: Extraocular movements intact.      Pupils: Pupils are equal, round, and reactive to light.   Cardiovascular:      Rate and  Rhythm: Normal rate and regular rhythm.   Pulmonary:      Effort: No respiratory distress.      Breath sounds: Normal breath sounds. No wheezing.   Abdominal:      General: Abdomen is flat. There is no distension.      Palpations: Abdomen is soft.      Tenderness: There is no abdominal tenderness.   Musculoskeletal:         General: No swelling or deformity. Normal range of motion.      Cervical back: Normal range of motion and neck supple. No tenderness.      Comments: No midline spinal tenderness or step-offs.  Patient has right lower paralumbar muscular tenderness to palpation   Skin:     General: Skin is warm.      Findings: No erythema.   Neurological:      Mental Status: She is alert and oriented to person, place, and time. Mental status is at baseline.      Cranial Nerves: No cranial nerve deficit.      Sensory: No sensory deficit.      Motor: No weakness.         Results Reviewed       Procedure Component Value Units Date/Time    Basic metabolic panel [[id number]]  (Abnormal) Collected: 01/27/25 2127    Lab Status: Final result Specimen: Blood from Arm, Left Updated: 01/27/25 2145     Sodium 132 mmol/L      Potassium 4.3 mmol/L      Chloride 99 mmol/L      CO2 27 mmol/L      ANION GAP 6 mmol/L      BUN 35 mg/dL      Creatinine 1.21 mg/dL      Glucose 89 mg/dL      Calcium 8.6 mg/dL      eGFR 42 ml/min/1.73sq m     Narrative:      National Kidney Disease Foundation guidelines for Chronic Kidney Disease (CKD):     Stage 1 with normal or high GFR (GFR > 90 mL/min/1.73 square meters)    Stage 2 Mild CKD (GFR = 60-89 mL/min/1.73 square meters)    Stage 3A Moderate CKD (GFR = 45-59 mL/min/1.73 square meters)    Stage 3B Moderate CKD (GFR = 30-44 mL/min/1.73 square meters)    Stage 4 Severe CKD (GFR = 15-29 mL/min/1.73 square meters)    Stage 5 End Stage CKD (GFR <15 mL/min/1.73 square meters)  Note: GFR calculation is accurate only with a steady state creatinine    CBC and differential [[id number]]  (Abnormal)  Collected: 01/27/25 2127    Lab Status: Final result Specimen: Blood from Arm, Left Updated: 01/27/25 2131     WBC 7.38 Thousand/uL      RBC 3.56 Million/uL      Hemoglobin 10.4 g/dL      Hematocrit 30.6 %      MCV 86 fL      MCH 29.2 pg      MCHC 34.0 g/dL      RDW 12.9 %      MPV 8.7 fL      Platelets 236 Thousands/uL      nRBC 0 /100 WBCs      Segmented % 73 %      Immature Grans % 1 %      Lymphocytes % 17 %      Monocytes % 7 %      Eosinophils Relative 2 %      Basophils Relative 0 %      Absolute Neutrophils 5.43 Thousands/µL      Absolute Immature Grans 0.04 Thousand/uL      Absolute Lymphocytes 1.27 Thousands/µL      Absolute Monocytes 0.50 Thousand/µL      Eosinophils Absolute 0.12 Thousand/µL      Basophils Absolute 0.02 Thousands/µL             CT lumbar spine without contrast   Final Interpretation by Sudhir Meng MD (01/27 2024)      Acute nondisplaced fractures involving the right transverse processes of L3 and L4.      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      Workstation performed: [id number]             Procedures    ED Medication and Procedure Management   Prior to Admission Medications   Prescriptions Last Dose Informant Patient Reported? Taking?   amLODIPine (NORVASC) 5 mg tablet   No No   Sig: Take 1 tablet (5 mg total) by mouth daily   atenolol (TENORMIN) 25 mg tablet   No No   Sig: Take 1 tablet (25 mg total) by mouth daily Patient takes at 2300 at home      Facility-Administered Medications: None     Discharge Medication List as of 1/27/2025  9:13 PM        CONTINUE these medications which have NOT CHANGED    Details   amLODIPine (NORVASC) 5 mg tablet Take 1 tablet (5 mg total) by mouth daily, Starting Wed 10/16/2024, Until Fri 11/15/2024, Normal      atenolol (TENORMIN) 25 mg tablet Take 1 tablet (25 mg total) by mouth daily Patient takes at 2300 at home, Starting Wed 10/16/2024, Normal              ED SEPSIS DOCUMENTATION   Time reflects when diagnosis was documented in both MDM as applicable and the Disposition within this note       Time User Action Codes Description Comment    1/27/2025  9:10 PM Sharonda Maddox Add [W19.XXXA] Fall, initial encounter     1/27/2025  9:11 PM Sharonda Maddox Add [S32.009A] Closed fracture of transverse process of lumbar vertebra, initial encounter ([facility])     1/27/2025  9:11 PM Sharonda Maddox Modify [S32.009A] Closed fracture of transverse process of lumbar vertebra, initial encounter ([facility]) L3, L4                 Sharonda Maddox DO  01/28/25 0034

## 2025-01-28 NOTE — TELEPHONE ENCOUNTER
"Ok to triage for ORTHOSX referral due to ACUTE closed fracture.    Attempt to call the patient per [facility] referral.    Preferred phone number is for her son Manuel (no communication consent). Phone number [phone] has been changed or disconnected.    I dialed the second phone number listed [phone] , did not ring. Ongoing message states \"the person you are trying to reach has a mailbox that is not set up yet\".    This is the 1st attempt to reach the patient. Will defer for 3 days per protocol.    NOTE:   [place] address.   Patient has appt scheduled with [facility] from [facility].    [facility] nurses are only licensed to triage in [place].  "

## 2025-02-04 NOTE — TELEPHONE ENCOUNTER
Call placed to the patient per [facility] referral.    Message states the number has been changed or disconnected.  No option to LVM.    This the 2nd attempt to reach the patient.   Will defer per protocol.    Patients address is [place].   Epic shows patient has a follow up appointment with physician in [facility]  for 2/6/25.

## 2025-02-26 NOTE — NURSING NOTE
Allergy alert received for upcoming CTA chest.Per allergy record, pt has hx IV iodinated contrast allergy. Possible contrast reaction per note 10/11//24 by Dr. Moreno after evaluating patient post CT. IV benadryl 50 mg given, patient was not premedicated by ED. Spoke with Heidy at [facility] in [place], message left for clinical team and Dr. Blevins that patient requires an allergy prep for this test. Call back number given. Patient home number is not in service, patient's son Manuel's number is not in service. Left message for patient on mobile phone to notify her of need for allergy prep and request to ordering provider. Call back number given.

## 2025-03-04 NOTE — NURSING NOTE
Spoke to [place] at Dr. Cr office today. Order received via fax for an Adult Echo TTE. Per [place], CT is cancelled due to patient being allergic to IV contrast and Prednisone. Left message for patient on cell phone that CT appointment on 3/10 is cancelled. Phone number for central scheduling given for patient to schedule Adult Echo TTE. Son's cell number is not in service. Order for echo to be scanned in to chart.

## 2025-03-10 ENCOUNTER — HOSPITAL ENCOUNTER (OUTPATIENT)
Dept: CT IMAGING | Facility: HOSPITAL | Age: 80
Discharge: HOME | End: 2025-03-10

## 2025-03-11 ENCOUNTER — HOSPITAL ENCOUNTER (OUTPATIENT)
Dept: NON INVASIVE DIAGNOSTICS | Age: 80
Discharge: HOME | End: 2025-03-11
Payer: MEDICARE

## 2025-03-11 ENCOUNTER — APPOINTMENT (OUTPATIENT)
Dept: LAB | Facility: HOSPITAL | Age: 80
End: 2025-03-11
Payer: MEDICARE

## 2025-03-11 ENCOUNTER — HOSPITAL ENCOUNTER (OUTPATIENT)
Dept: NON INVASIVE DIAGNOSTICS | Age: 80
End: 2025-03-11
Payer: MEDICARE

## 2025-03-11 VITALS
WEIGHT: 130 LBS | HEIGHT: 62 IN | SYSTOLIC BLOOD PRESSURE: 170 MMHG | DIASTOLIC BLOOD PRESSURE: 71 MMHG | BODY MASS INDEX: 23.92 KG/M2 | HEART RATE: 67 BPM

## 2025-03-11 DIAGNOSIS — I77.819 AORTIC DILATATION (HCC): ICD-10-CM

## 2025-03-11 DIAGNOSIS — I65.23 BILATERAL CAROTID ARTERY STENOSIS: ICD-10-CM

## 2025-03-11 DIAGNOSIS — D64.9 ANEMIA, UNSPECIFIED TYPE: ICD-10-CM

## 2025-03-11 DIAGNOSIS — Z00.00 ROUTINE GENERAL MEDICAL EXAMINATION AT A HEALTH CARE FACILITY: ICD-10-CM

## 2025-03-11 DIAGNOSIS — E87.1 HYPONATREMIA: ICD-10-CM

## 2025-03-11 LAB
BASOPHILS # BLD AUTO: 0.03 THOUSANDS/ÂΜL (ref 0–0.1)
BASOPHILS NFR BLD AUTO: 0 % (ref 0–1)
EOSINOPHIL # BLD AUTO: 0.11 THOUSAND/ÂΜL (ref 0–0.61)
EOSINOPHIL NFR BLD AUTO: 1 % (ref 0–6)
ERYTHROCYTE [DISTWIDTH] IN BLOOD BY AUTOMATED COUNT: 13.6 % (ref 11.6–15.1)
FERRITIN SERPL-MCNC: 289 NG/ML (ref 11–307)
FOLATE SERPL-MCNC: 22.2 NG/ML
HCT VFR BLD AUTO: 34.4 % (ref 34.8–46.1)
HGB BLD-MCNC: 11.1 G/DL (ref 11.5–15.4)
IMM GRANULOCYTES # BLD AUTO: 0.08 THOUSAND/UL (ref 0–0.2)
IMM GRANULOCYTES NFR BLD AUTO: 1 % (ref 0–2)
IRON SATN MFR SERPL: 27 % (ref 15–50)
IRON SERPL-MCNC: 97 UG/DL (ref 50–212)
LYMPHOCYTES # BLD AUTO: 1.66 THOUSANDS/ÂΜL (ref 0.6–4.47)
LYMPHOCYTES NFR BLD AUTO: 17 % (ref 14–44)
MCH RBC QN AUTO: 28.5 PG (ref 26.8–34.3)
MCHC RBC AUTO-ENTMCNC: 32.3 G/DL (ref 31.4–37.4)
MCV RBC AUTO: 88 FL (ref 82–98)
MONOCYTES # BLD AUTO: 0.54 THOUSAND/ÂΜL (ref 0.17–1.22)
MONOCYTES NFR BLD AUTO: 5 % (ref 4–12)
NEUTROPHILS # BLD AUTO: 7.65 THOUSANDS/ÂΜL (ref 1.85–7.62)
NEUTS SEG NFR BLD AUTO: 76 % (ref 43–75)
NRBC BLD AUTO-RTO: 0 /100 WBCS
PLATELET # BLD AUTO: 308 THOUSANDS/UL (ref 149–390)
PMV BLD AUTO: 9.2 FL (ref 8.9–12.7)
RBC # BLD AUTO: 3.9 MILLION/UL (ref 3.81–5.12)
T3 SERPL-MCNC: 1 NG/ML (ref 0.9–1.8)
T4 FREE SERPL-MCNC: 0.81 NG/DL (ref 0.61–1.12)
TIBC SERPL-MCNC: 355.6 UG/DL (ref 250–450)
TRANSFERRIN SERPL-MCNC: 254 MG/DL (ref 203–362)
TSH SERPL DL<=0.05 MIU/L-ACNC: 6 UIU/ML (ref 0.45–4.5)
UIBC SERPL-MCNC: 259 UG/DL (ref 155–355)
VIT B12 SERPL-MCNC: 867 PG/ML (ref 180–914)
WBC # BLD AUTO: 10.07 THOUSAND/UL (ref 4.31–10.16)

## 2025-03-11 PROCEDURE — 93321 DOPPLER ECHO F-UP/LMTD STD: CPT | Performed by: INTERNAL MEDICINE

## 2025-03-11 PROCEDURE — 82728 ASSAY OF FERRITIN: CPT

## 2025-03-11 PROCEDURE — 84480 ASSAY TRIIODOTHYRONINE (T3): CPT

## 2025-03-11 PROCEDURE — 84439 ASSAY OF FREE THYROXINE: CPT

## 2025-03-11 PROCEDURE — 84443 ASSAY THYROID STIM HORMONE: CPT

## 2025-03-11 PROCEDURE — 83550 IRON BINDING TEST: CPT

## 2025-03-11 PROCEDURE — 36415 COLL VENOUS BLD VENIPUNCTURE: CPT

## 2025-03-11 PROCEDURE — 85025 COMPLETE CBC W/AUTO DIFF WBC: CPT

## 2025-03-11 PROCEDURE — 93308 TTE F-UP OR LMTD: CPT

## 2025-03-11 PROCEDURE — 82607 VITAMIN B-12: CPT

## 2025-03-11 PROCEDURE — 93880 EXTRACRANIAL BILAT STUDY: CPT | Performed by: SURGERY

## 2025-03-11 PROCEDURE — 93325 DOPPLER ECHO COLOR FLOW MAPG: CPT | Performed by: INTERNAL MEDICINE

## 2025-03-11 PROCEDURE — 93308 TTE F-UP OR LMTD: CPT | Performed by: INTERNAL MEDICINE

## 2025-03-11 PROCEDURE — 93321 DOPPLER ECHO F-UP/LMTD STD: CPT

## 2025-03-11 PROCEDURE — 82746 ASSAY OF FOLIC ACID SERUM: CPT

## 2025-03-11 PROCEDURE — 93880 EXTRACRANIAL BILAT STUDY: CPT

## 2025-03-11 PROCEDURE — 93325 DOPPLER ECHO COLOR FLOW MAPG: CPT

## 2025-03-11 PROCEDURE — 83540 ASSAY OF IRON: CPT

## 2025-03-12 LAB
AORTIC ROOT: 3.4 CM
ASCENDING AORTA: 2.9 CM
BSA FOR ECHO PROCEDURE: 1.59 M2
E WAVE DECELERATION TIME: 260 MS
E/A RATIO: 0.75
FRACTIONAL SHORTENING: 32 (ref 28–44)
INTERVENTRICULAR SEPTUM IN DIASTOLE (PARASTERNAL SHORT AXIS VIEW): 1.1 CM
INTERVENTRICULAR SEPTUM: 1.1 CM (ref 0.6–1.1)
LAAS-AP2: 23 CM2
LAAS-AP4: 17.6 CM2
LEFT ATRIUM SIZE: 3.5 CM
LEFT ATRIUM VOLUME (MOD BIPLANE): 62 ML
LEFT ATRIUM VOLUME INDEX (MOD BIPLANE): 39 ML/M2
LEFT INTERNAL DIMENSION IN SYSTOLE: 1.9 CM (ref 2.1–4)
LEFT VENTRICLE DIASTOLIC VOLUME (MOD BIPLANE): 115 ML
LEFT VENTRICLE DIASTOLIC VOLUME INDEX (MOD BIPLANE): 72.3 ML/M2
LEFT VENTRICLE SYSTOLIC VOLUME (MOD BIPLANE): 41 ML
LEFT VENTRICLE SYSTOLIC VOLUME INDEX (MOD BIPLANE): 25.8 ML/M2
LEFT VENTRICULAR INTERNAL DIMENSION IN DIASTOLE: 2.8 CM (ref 3.5–6)
LEFT VENTRICULAR POSTERIOR WALL IN END DIASTOLE: 1 CM
LEFT VENTRICULAR STROKE VOLUME: 18 ML
LV EF BIPLANE MOD: 64 %
LV EF US.2D.A4C+ESTIMATED: 68 %
LVSV (TEICH): 18 ML
MV E'TISSUE VEL-LAT: 8 CM/S
MV E'TISSUE VEL-SEP: 7 CM/S
MV PEAK A VEL: 1.19 M/S
MV PEAK E VEL: 89 CM/S
MV STENOSIS PRESSURE HALF TIME: 75 MS
MV VALVE AREA P 1/2 METHOD: 2.9
RIGHT ATRIUM AREA SYSTOLE A4C: 11 CM2
RIGHT VENTRICLE ID DIMENSION: 3.1 CM
SL CV LEFT ATRIUM LENGTH A2C: 5.7 CM
SL CV LV EF: 65
SL CV PED ECHO LEFT VENTRICLE DIASTOLIC VOLUME (MOD BIPLANE) 2D: 29 ML
SL CV PED ECHO LEFT VENTRICLE SYSTOLIC VOLUME (MOD BIPLANE) 2D: 11 ML
TR MAX PG: 26 MMHG
TR PEAK VELOCITY: 2.6 M/S
TRICUSPID ANNULAR PLANE SYSTOLIC EXCURSION: 2.3 CM
TRICUSPID VALVE PEAK REGURGITATION VELOCITY: 2.55 M/S

## 2025-04-28 ENCOUNTER — APPOINTMENT (OUTPATIENT)
Dept: LAB | Facility: CLINIC | Age: 80
End: 2025-04-28
Payer: MEDICARE

## 2025-04-28 DIAGNOSIS — E87.1 HYPONATREMIA: ICD-10-CM

## 2025-04-28 LAB
ANION GAP SERPL CALCULATED.3IONS-SCNC: 7 MMOL/L (ref 4–13)
BUN SERPL-MCNC: 30 MG/DL (ref 5–25)
CALCIUM SERPL-MCNC: 9.5 MG/DL (ref 8.4–10.2)
CHLORIDE SERPL-SCNC: 101 MMOL/L (ref 96–108)
CO2 SERPL-SCNC: 31 MMOL/L (ref 21–32)
CREAT SERPL-MCNC: 1.3 MG/DL (ref 0.6–1.3)
GFR SERPL CREATININE-BSD FRML MDRD: 38 ML/MIN/1.73SQ M
GLUCOSE SERPL-MCNC: 91 MG/DL (ref 65–140)
POTASSIUM SERPL-SCNC: 4.8 MMOL/L (ref 3.5–5.3)
SODIUM SERPL-SCNC: 139 MMOL/L (ref 135–147)

## 2025-04-28 PROCEDURE — 36415 COLL VENOUS BLD VENIPUNCTURE: CPT

## 2025-04-28 PROCEDURE — 80048 BASIC METABOLIC PNL TOTAL CA: CPT

## 2025-05-05 ENCOUNTER — HOSPITAL ENCOUNTER (EMERGENCY)
Facility: HOSPITAL | Age: 80
Discharge: HOME | End: 2025-05-05
Attending: EMERGENCY MEDICINE
Payer: MEDICARE

## 2025-05-05 ENCOUNTER — APPOINTMENT (EMERGENCY)
Dept: RADIOLOGY | Facility: HOSPITAL | Age: 80
End: 2025-05-05
Payer: MEDICARE

## 2025-05-05 ENCOUNTER — APPOINTMENT (EMERGENCY)
Dept: MRI IMAGING | Facility: HOSPITAL | Age: 80
End: 2025-05-05
Payer: MEDICARE

## 2025-05-05 ENCOUNTER — APPOINTMENT (EMERGENCY)
Dept: CT IMAGING | Facility: HOSPITAL | Age: 80
End: 2025-05-05
Payer: MEDICARE

## 2025-05-05 VITALS
HEART RATE: 58 BPM | RESPIRATION RATE: 20 BRPM | SYSTOLIC BLOOD PRESSURE: 161 MMHG | OXYGEN SATURATION: 99 % | TEMPERATURE: 98.2 F | DIASTOLIC BLOOD PRESSURE: 71 MMHG

## 2025-05-05 DIAGNOSIS — R41.82 ALTERED MENTAL STATUS: Primary | ICD-10-CM

## 2025-05-05 DIAGNOSIS — R51.9 HEADACHE: ICD-10-CM

## 2025-05-05 LAB
ALBUMIN SERPL BCG-MCNC: 4.1 G/DL (ref 3.5–5)
ALP SERPL-CCNC: 73 U/L (ref 34–104)
ALT SERPL W P-5'-P-CCNC: 11 U/L (ref 7–52)
AMMONIA PLAS-SCNC: 13 UMOL/L (ref 18–72)
ANION GAP SERPL CALCULATED.3IONS-SCNC: 8 MMOL/L (ref 4–13)
APAP SERPL-MCNC: 5 UG/ML (ref 10–20)
APTT PPP: 25 SECONDS (ref 23–34)
AST SERPL W P-5'-P-CCNC: 15 U/L (ref 13–39)
ATRIAL RATE: 67 BPM
BACTERIA UR QL AUTO: ABNORMAL /HPF
BASE EXCESS BLDA CALC-SCNC: 1 MMOL/L (ref -2–3)
BASOPHILS # BLD AUTO: 0.03 THOUSANDS/ÂΜL (ref 0–0.1)
BASOPHILS NFR BLD AUTO: 0 % (ref 0–1)
BILIRUB SERPL-MCNC: 0.6 MG/DL (ref 0.2–1)
BILIRUB UR QL STRIP: NEGATIVE
BUN SERPL-MCNC: 33 MG/DL (ref 5–25)
CA-I BLD-SCNC: 1.11 MMOL/L (ref 1.12–1.32)
CALCIUM SERPL-MCNC: 8.6 MG/DL (ref 8.4–10.2)
CARDIAC TROPONIN I PNL SERPL HS: 4 NG/L (ref ?–50)
CHLORIDE SERPL-SCNC: 98 MMOL/L (ref 96–108)
CLARITY UR: CLEAR
CO2 SERPL-SCNC: 27 MMOL/L (ref 21–32)
COLOR UR: ABNORMAL
CREAT SERPL-MCNC: 1.26 MG/DL (ref 0.6–1.3)
EOSINOPHIL # BLD AUTO: 0.12 THOUSAND/ÂΜL (ref 0–0.61)
EOSINOPHIL NFR BLD AUTO: 1 % (ref 0–6)
ERYTHROCYTE [DISTWIDTH] IN BLOOD BY AUTOMATED COUNT: 14.9 % (ref 11.6–15.1)
ETHANOL SERPL-MCNC: <10 MG/DL
GFR SERPL CREATININE-BSD FRML MDRD: 40 ML/MIN/1.73SQ M
GLUCOSE SERPL-MCNC: 109 MG/DL (ref 65–140)
GLUCOSE SERPL-MCNC: 110 MG/DL (ref 65–140)
GLUCOSE UR STRIP-MCNC: NEGATIVE MG/DL
HCO3 BLDA-SCNC: 26 MMOL/L (ref 24–30)
HCT VFR BLD AUTO: 29 % (ref 34.8–46.1)
HCT VFR BLD CALC: 31 % (ref 34.8–46.1)
HGB BLD-MCNC: 12 G/DL (ref 11.5–15.4)
HGB BLDA-MCNC: 10.5 G/DL (ref 11.5–15.4)
HGB UR QL STRIP.AUTO: ABNORMAL
IMM GRANULOCYTES # BLD AUTO: 0.1 THOUSAND/UL (ref 0–0.2)
IMM GRANULOCYTES NFR BLD AUTO: 1 % (ref 0–2)
INR PPP: 0.93 (ref 0.85–1.19)
KETONES UR STRIP-MCNC: NEGATIVE MG/DL
LEUKOCYTE ESTERASE UR QL STRIP: ABNORMAL
LYMPHOCYTES # BLD AUTO: 1.13 THOUSANDS/ÂΜL (ref 0.6–4.47)
LYMPHOCYTES NFR BLD AUTO: 9 % (ref 14–44)
MCH RBC QN AUTO: 38 PG (ref 26.8–34.3)
MCHC RBC AUTO-ENTMCNC: 33.6 G/DL (ref 31.4–37.4)
MCV RBC AUTO: 92 FL (ref 82–98)
MONOCYTES # BLD AUTO: 0.57 THOUSAND/ÂΜL (ref 0.17–1.22)
MONOCYTES NFR BLD AUTO: 5 % (ref 4–12)
NEUTROPHILS # BLD AUTO: 10.44 THOUSANDS/ÂΜL (ref 1.85–7.62)
NEUTS SEG NFR BLD AUTO: 84 % (ref 43–75)
NITRITE UR QL STRIP: NEGATIVE
NON-SQ EPI CELLS URNS QL MICRO: ABNORMAL /HPF
NRBC BLD AUTO-RTO: 0 /100 WBCS
P AXIS: 57 DEGREES
PCO2 BLD: 27 MMOL/L (ref 21–32)
PCO2 BLD: 41.4 MM HG (ref 42–50)
PH BLD: 7.41 [PH] (ref 7.3–7.4)
PH UR STRIP.AUTO: 7 [PH]
PLATELET # BLD AUTO: 286 THOUSANDS/UL (ref 149–390)
PMV BLD AUTO: 9 FL (ref 8.9–12.7)
PO2 BLD: 29 MM HG (ref 35–45)
POTASSIUM BLD-SCNC: 4.5 MMOL/L (ref 3.5–5.3)
POTASSIUM SERPL-SCNC: 4.5 MMOL/L (ref 3.5–5.3)
PR INTERVAL: 180 MS
PROT SERPL-MCNC: 6.7 G/DL (ref 6.4–8.4)
PROT UR STRIP-MCNC: NEGATIVE MG/DL
PROTHROMBIN TIME: 13 SECONDS (ref 12.3–15)
QRS AXIS: 39 DEGREES
QRSD INTERVAL: 76 MS
QT INTERVAL: 446 MS
QTC INTERVAL: 471 MS
RBC # BLD AUTO: 3.16 MILLION/UL (ref 3.81–5.12)
RBC #/AREA URNS AUTO: ABNORMAL /HPF
SALICYLATES SERPL-MCNC: <5 MG/DL (ref 5–20)
SAO2 % BLD FROM PO2: 55 % (ref 60–85)
SODIUM BLD-SCNC: 132 MMOL/L (ref 136–145)
SODIUM SERPL-SCNC: 133 MMOL/L (ref 135–147)
SP GR UR STRIP.AUTO: 1.01 (ref 1–1.03)
SPECIMEN SOURCE: ABNORMAL
T WAVE AXIS: 72 DEGREES
T4 FREE SERPL-MCNC: 0.83 NG/DL (ref 0.61–1.12)
TSH SERPL DL<=0.05 MIU/L-ACNC: 6.24 UIU/ML (ref 0.45–4.5)
UROBILINOGEN UR STRIP-ACNC: <2 MG/DL
VENTRICULAR RATE: 67 BPM
WBC # BLD AUTO: 12.39 THOUSAND/UL (ref 4.31–10.16)
WBC #/AREA URNS AUTO: ABNORMAL /HPF

## 2025-05-05 PROCEDURE — 84439 ASSAY OF FREE THYROXINE: CPT | Performed by: EMERGENCY MEDICINE

## 2025-05-05 PROCEDURE — 85610 PROTHROMBIN TIME: CPT | Performed by: EMERGENCY MEDICINE

## 2025-05-05 PROCEDURE — 71045 X-RAY EXAM CHEST 1 VIEW: CPT

## 2025-05-05 PROCEDURE — 82803 BLOOD GASES ANY COMBINATION: CPT

## 2025-05-05 PROCEDURE — 85014 HEMATOCRIT: CPT

## 2025-05-05 PROCEDURE — 84295 ASSAY OF SERUM SODIUM: CPT

## 2025-05-05 PROCEDURE — 70551 MRI BRAIN STEM W/O DYE: CPT

## 2025-05-05 PROCEDURE — 99285 EMERGENCY DEPT VISIT HI MDM: CPT | Performed by: EMERGENCY MEDICINE

## 2025-05-05 PROCEDURE — 84443 ASSAY THYROID STIM HORMONE: CPT | Performed by: EMERGENCY MEDICINE

## 2025-05-05 PROCEDURE — 82330 ASSAY OF CALCIUM: CPT

## 2025-05-05 PROCEDURE — 84132 ASSAY OF SERUM POTASSIUM: CPT

## 2025-05-05 PROCEDURE — 82077 ASSAY SPEC XCP UR&BREATH IA: CPT | Performed by: EMERGENCY MEDICINE

## 2025-05-05 PROCEDURE — 80053 COMPREHEN METABOLIC PANEL: CPT | Performed by: EMERGENCY MEDICINE

## 2025-05-05 PROCEDURE — 99284 EMERGENCY DEPT VISIT MOD MDM: CPT

## 2025-05-05 PROCEDURE — 84484 ASSAY OF TROPONIN QUANT: CPT | Performed by: EMERGENCY MEDICINE

## 2025-05-05 PROCEDURE — 93010 ELECTROCARDIOGRAM REPORT: CPT | Performed by: INTERNAL MEDICINE

## 2025-05-05 PROCEDURE — 82947 ASSAY GLUCOSE BLOOD QUANT: CPT

## 2025-05-05 PROCEDURE — 82140 ASSAY OF AMMONIA: CPT | Performed by: EMERGENCY MEDICINE

## 2025-05-05 PROCEDURE — 85025 COMPLETE CBC W/AUTO DIFF WBC: CPT | Performed by: EMERGENCY MEDICINE

## 2025-05-05 PROCEDURE — 80179 DRUG ASSAY SALICYLATE: CPT | Performed by: EMERGENCY MEDICINE

## 2025-05-05 PROCEDURE — 36415 COLL VENOUS BLD VENIPUNCTURE: CPT | Performed by: EMERGENCY MEDICINE

## 2025-05-05 PROCEDURE — 80143 DRUG ASSAY ACETAMINOPHEN: CPT | Performed by: EMERGENCY MEDICINE

## 2025-05-05 PROCEDURE — 85730 THROMBOPLASTIN TIME PARTIAL: CPT | Performed by: EMERGENCY MEDICINE

## 2025-05-05 PROCEDURE — 81001 URINALYSIS AUTO W/SCOPE: CPT | Performed by: EMERGENCY MEDICINE

## 2025-05-05 PROCEDURE — 93005 ELECTROCARDIOGRAM TRACING: CPT

## 2025-05-05 NOTE — ED PROVIDER NOTES
Time reflects when diagnosis was documented in both MDM as applicable and the Disposition within this note       Time User Action Codes Description Comment    5/5/2025  6:21 AM Destin Lugo Add [R41.82] Altered mental status     5/5/2025  1:17 PM Lobo Summers Add [R51.9] Headache           ED Disposition       ED Disposition   Discharge    Condition   Stable    Date/Time   Mon May 5, 2025  1:17 PM    Comment   Ashlee Bermeo discharge to home/self care.                   Assessment & Plan       Medical Decision Making  Confusion differential includes toxic metabolic etiology hyponatremia versus acute CNS injury workup in progress including lab work and CAT scan patient declines IV contrast secondary to allergy    Amount and/or Complexity of Data Reviewed  Labs: ordered.  Radiology: ordered and independent interpretation performed.        ED Course as of 05/08/25 1248   Mon May 05, 2025   0545 Patient has contrast dye allergy. I  had a discussion with the patient and her son at this point they do not want to undergo a contrast study I did offer Benadryl and steroids prior to the procedure as a pretreatment but they declined based on her history of similar presentations in the past resulting in diagnosis of hyponatremia 2 times previously.  They understand the limitations of vascular evaluation   0610 Patient and son now refusing CAT scan secondary to radiation and they are requesting MRI   0619 Spoke with MRI who would be able to do the study today at noon       Medications - No data to display    ED Risk Strat Scores         Stroke Assessment       Row Name 05/05/25 0548             NIH Stroke Scale    Interval Baseline      Level of Consciousness (1a.) 0      LOC Questions (1b.) 0      LOC Commands (1c.) 0      Best Gaze (2.) 0      Visual (3.) 0      Facial Palsy (4.) 0      Motor Arm, Left (5a.) 0      Motor Arm, Right (5b.) 0      Motor Leg, Left (6a.) 0      Motor Leg, Right (6b.) 0      Limb Ataxia (7.) 0       Sensory (8.) 0      Best Language (9.) 0      Dysarthria (10.) 0      Extinction and Inattention (11.) (Formerly Neglect) 0      Total 0                              No data recorded        SBIRT 20yo+      Flowsheet Row Most Recent Value   Initial Alcohol Screen: US AUDIT-C     1. How often do you have a drink containing alcohol? 0 Filed at: 05/05/2025 0522   2. How many drinks containing alcohol do you have on a typical day you are drinking?  0 Filed at: 05/05/2025 0522   3a. Male UNDER 65: How often do you have five or more drinks on one occasion? 0 Filed at: 05/05/2025 0522   3b. FEMALE Any Age, or MALE 65+: How often do you have 4 or more drinks on one occassion? 0 Filed at: 05/05/2025 0522   Audit-C Score 0 Filed at: 05/05/2025 0522   MARIANELA: How many times in the past year have you...    Used an illegal drug or used a prescription medication for non-medical reasons? Never Filed at: 05/05/2025 0522                            History of Present Illness       Chief Complaint   Patient presents with    Headache     Pressure in the head started at 1 am. + Lightheaded/dizziness, new onset of confusion. Vision changes in the past weeks.        Past Medical History:   Diagnosis Date    Hypertension     Hyponatremia       Past Surgical History:   Procedure Laterality Date    SHOULDER SURGERY Right       History reviewed. No pertinent family history.   Social History     Tobacco Use    Smoking status: Never    Smokeless tobacco: Never   Vaping Use    Vaping status: Never Used   Substance Use Topics    Alcohol use: Never    Drug use: Never      E-Cigarette/Vaping    E-Cigarette Use Never User       E-Cigarette/Vaping Substances    Nicotine No     THC No     CBD No     Flavoring No     Other No     Unknown No       I have reviewed and agree with the history as documented.     This is an 79-year-old female who presents with her son for evaluation of confusion from 1:00 this morning until 2:00 she is currently much  improved.  Son states that she has had 2 similar prior presentations with a diagnosis of hyponatremia after stroke evaluation was negative and she was found to have a sodium of 126 secondary to decreased p.o. intake.  The son does state that he gave her some food and drink prior to arrival in the emergency room which seemed to make her better.  She is currently awake alert oriented without any focal neurologic deficits no recent fall or injury no blood thinners.  She does have a contrast dye allergy and is currently declining a contrast CAT scan.  She also complains of an occipital headache and dry eyes.  Son does also state that she seems to be having difficulty getting the words out but that was also present during her previous presentations      History provided by:  Patient and relative  Medical Problem  Location:  Occipital  Quality:  Headache  Severity:  Moderate  Onset quality:  Gradual  Duration:  1 hour  Timing:  Constant  Progression:  Resolved  Chronicity:  Recurrent  Context:  Occipital headache and confusion  Relieved by:  Food and water intake  Associated symptoms: headaches    Associated symptoms: no abdominal pain, no chest pain, no nausea and no vomiting        Review of Systems   Cardiovascular:  Negative for chest pain.   Gastrointestinal:  Negative for abdominal pain, nausea and vomiting.   Neurological:  Positive for speech difficulty and headaches.   All other systems reviewed and are negative.          Objective       ED Triage Vitals   Temperature Pulse Blood Pressure Respirations SpO2 Patient Position - Orthostatic VS   05/05/25 0519 05/05/25 0519 05/05/25 0519 05/05/25 0519 05/05/25 0519 05/05/25 0655   98.2 °F (36.8 °C) 69 (!) 216/94 16 96 % Sitting      Temp Source Heart Rate Source BP Location FiO2 (%) Pain Score    05/05/25 0519 05/05/25 0519 05/05/25 0519 -- --    Temporal Monitor Left arm        Vitals      Date and Time Temp Pulse SpO2 Resp BP Pain Score FACES Pain Rating User    05/05/25 1315 -- 58 99 % 20 161/71 -- -- AM   05/05/25 1145 -- 57 99 % 19 208/81 -- -- AM   05/05/25 1015 -- 62 95 % 18 173/73 -- -- AM   05/05/25 1000 -- 55 96 % 16 158/69 -- -- AM   05/05/25 0930 -- 59 99 % 16 163/72 -- -- MB   05/05/25 0830 -- 60 93 % 20 150/71 -- -- AM   05/05/25 0815 -- 61 95 % 20 158/72 -- -- AM   05/05/25 0745 -- 62 97 % 16 197/69 -- -- MB   05/05/25 0700 -- 65 99 % 18 201/86 -- -- AM   05/05/25 0655 -- 63 95 % 16 225/91 -- -- AM   05/05/25 0519 98.2 °F (36.8 °C) 69 96 % 16 216/94 -- -- AC            Physical Exam  Vitals and nursing note reviewed.   Constitutional:       General: She is not in acute distress.     Appearance: She is not toxic-appearing.   HENT:      Head: Normocephalic and atraumatic.      Right Ear: Tympanic membrane, ear canal and external ear normal.      Left Ear: Tympanic membrane, ear canal and external ear normal.      Mouth/Throat:      Mouth: Mucous membranes are dry.   Eyes:      General:         Right eye: No discharge.         Left eye: No discharge.      Extraocular Movements: Extraocular movements intact.      Pupils: Pupils are equal, round, and reactive to light.   Cardiovascular:      Rate and Rhythm: Normal rate and regular rhythm.      Pulses: Normal pulses.      Heart sounds: No murmur heard.     No friction rub. No gallop.   Pulmonary:      Effort: No respiratory distress.      Breath sounds: No stridor. No wheezing, rhonchi or rales.   Abdominal:      General: There is no distension.      Palpations: Abdomen is soft.      Tenderness: There is no abdominal tenderness.   Musculoskeletal:      Cervical back: Normal range of motion and neck supple. No rigidity or tenderness.      Right lower leg: Edema present.      Left lower leg: Edema present.   Skin:     General: Skin is warm and dry.      Coloration: Skin is not jaundiced.      Findings: No bruising, erythema or rash.   Neurological:      General: No focal deficit present.      Mental Status: She is  "alert and oriented to person, place, and time.      Cranial Nerves: No cranial nerve deficit.      Sensory: No sensory deficit.      Motor: No weakness.      Coordination: Coordination normal.      Gait: Gait normal.   Psychiatric:         Mood and Affect: Mood normal.         Behavior: Behavior normal.         Thought Content: Thought content normal.         Results Reviewed       Procedure Component Value Units Date/Time    T4, free [[id number]]  (Normal) Collected: 05/05/25 0550    Lab Status: Final result Specimen: Blood from Arm, Right Updated: 05/05/25 1216     Free T4 0.83 ng/dL     Narrative:        \"Therapeutic range for patients medicated with thyroid disorders: 0.61-1.24 ng/dL.\"    Urine Microscopic [[id number]]  (Abnormal) Collected: 05/05/25 0733    Lab Status: Final result Specimen: Urine, Clean Catch Updated: 05/05/25 0806     RBC, UA 1-2 /hpf      WBC, UA 4-10 /hpf      Epithelial Cells Occasional /hpf      Bacteria, UA Occasional /hpf     UA w Reflex to Microscopic w Reflex to Culture [[id number]]  (Abnormal) Collected: 05/05/25 0733    Lab Status: Final result Specimen: Urine, Clean Catch Updated: 05/05/25 0806     Color, UA Light Yellow     Clarity, UA Clear     Specific Gravity, UA 1.010     pH, UA 7.0     Leukocytes, UA Large     Nitrite, UA Negative     Protein, UA Negative mg/dl      Glucose, UA Negative mg/dl      Ketones, UA Negative mg/dl      Urobilinogen, UA <2.0 mg/dl      Bilirubin, UA Negative     Occult Blood, UA Small    Ethanol [[id number]]  (Normal) Collected: 05/05/25 0550    Lab Status: Final result Specimen: Blood from Arm, Right Updated: 05/05/25 0645     Ethanol Lvl <10 mg/dL     CBC and differential [[id number]]  (Abnormal) Collected: 05/05/25 0550    Lab Status: Final result Specimen: Blood from Arm, Right Updated: 05/05/25 0643     WBC 12.39 Thousand/uL      RBC 3.16 Million/uL      Hemoglobin 12.0 g/dL      Hematocrit 29.0 %      MCV 92 fL      MCH 38.0 pg      MCHC 33.6 g/dL  "     RDW 14.9 %      MPV 9.0 fL      Platelets 286 Thousands/uL      nRBC 0 /100 WBCs      Segmented % 84 %      Immature Grans % 1 %      Lymphocytes % 9 %      Monocytes % 5 %      Eosinophils Relative 1 %      Basophils Relative 0 %      Absolute Neutrophils 10.44 Thousands/µL      Absolute Immature Grans 0.10 Thousand/uL      Absolute Lymphocytes 1.13 Thousands/µL      Absolute Monocytes 0.57 Thousand/µL      Eosinophils Absolute 0.12 Thousand/µL      Basophils Absolute 0.03 Thousands/µL     Comprehensive metabolic panel [[id number]]  (Abnormal) Collected: 05/05/25 0550    Lab Status: Final result Specimen: Blood from Arm, Right Updated: 05/05/25 0632     Sodium 133 mmol/L      Potassium 4.5 mmol/L      Chloride 98 mmol/L      CO2 27 mmol/L      ANION GAP 8 mmol/L      BUN 33 mg/dL      Creatinine 1.26 mg/dL      Glucose 110 mg/dL      Calcium 8.6 mg/dL      AST 15 U/L      ALT 11 U/L      Alkaline Phosphatase 73 U/L      Total Protein 6.7 g/dL      Albumin 4.1 g/dL      Total Bilirubin 0.60 mg/dL      eGFR 40 ml/min/1.73sq m     Narrative:      National Kidney Disease Foundation guidelines for Chronic Kidney Disease (CKD):     Stage 1 with normal or high GFR (GFR > 90 mL/min/1.73 square meters)    Stage 2 Mild CKD (GFR = 60-89 mL/min/1.73 square meters)    Stage 3A Moderate CKD (GFR = 45-59 mL/min/1.73 square meters)    Stage 3B Moderate CKD (GFR = 30-44 mL/min/1.73 square meters)    Stage 4 Severe CKD (GFR = 15-29 mL/min/1.73 square meters)    Stage 5 End Stage CKD (GFR <15 mL/min/1.73 square meters)  Note: GFR calculation is accurate only with a steady state creatinine    Ammonia [[id number]]  (Abnormal) Collected: 05/05/25 0608    Lab Status: Final result Specimen: Blood from Arm, Right Updated: 05/05/25 0632     Ammonia 13 umol/L     Salicylate level [[id number]]  (Abnormal) Collected: 05/05/25 0550    Lab Status: Final result Specimen: Blood from Arm, Right Updated: 05/05/25 0632     Salicylate Lvl <5 mg/dL      Acetaminophen level-If concentration is detectable, please discuss with medical  on call. [[id number]]  (Abnormal) Collected: 05/05/25 0550    Lab Status: Final result Specimen: Blood from Arm, Right Updated: 05/05/25 0632     Acetaminophen Level 5 ug/mL     TSH, 3rd generation with Free T4 reflex [[id number]]  (Abnormal) Collected: 05/05/25 0550    Lab Status: Final result Specimen: Blood from Arm, Right Updated: 05/05/25 0630     TSH 3RD GENERATON 6.236 uIU/mL     HS Troponin 0hr (reflex protocol) [[id number]]  (Normal) Collected: 05/05/25 0550    Lab Status: Final result Specimen: Blood from Arm, Right Updated: 05/05/25 0622     hs TnI 0hr 4 ng/L     POCT Blood Gas (CG8+) [[id number]]  (Abnormal) Collected: 05/05/25 0617    Lab Status: Final result Specimen: Venous Updated: 05/05/25 0619     ph, Charlie ISTAT 7.406     pCO2, Charlie i-STAT 41.4 mm HG      pO2, Charlie i-STAT 29.0 mm HG      BE, i-STAT 1 mmol/L      HCO3, Charlie i-STAT 26.0 mmol/L      CO2, i-STAT 27 mmol/L      O2 Sat, i-STAT 55 %      SODIUM, I-STAT 132 mmol/l      Potassium, i-STAT 4.5 mmol/L      Calcium, Ionized i-STAT 1.11 mmol/L      Hct, i-STAT 31 %      Hgb, i-STAT 10.5 g/dl      Glucose, i-STAT 109 mg/dl      Specimen Type VENOUS    Protime-INR [[id number]]  (Normal) Collected: 05/05/25 0550    Lab Status: Final result Specimen: Blood from Arm, Right Updated: 05/05/25 0611     Protime 13.0 seconds      INR 0.93    Narrative:      INR Therapeutic Range    Indication                                             INR Range      Atrial Fibrillation                                               2.0-3.0  Hypercoagulable State                                    2.0.2.3  Left Ventricular Asist Device                            2.0-3.0  Mechanical Heart Valve                                  -    Aortic(with afib, MI, embolism, HF, LA enlargement,    and/or coagulopathy)                                     2.0-3.0 (2.5-3.5)     Mitral                                                              2.5-3.5  Prosthetic/Bioprosthetic Heart Valve               2.0-3.0  Venous thromboembolism (VTE: VT, PE        2.0-3.0    APTT [[id number]]  (Normal) Collected: 05/05/25 0550    Lab Status: Final result Specimen: Blood from Arm, Right Updated: 05/05/25 0611     PTT 25 seconds             MRI brain wo contrast   Final Interpretation by E. Alec Schoenberger, MD (05/05 1242)      No acute intracranial pathology. Chronic microangiopathy.      Workstation performed: [id number]         XR chest 1 view portable   ED Interpretation by Destin Lugo DO (05/05 0602)   No acute infiltrate or congestive heart failure      Final Interpretation by Chelly Arredondo MD (05/05 0827)      No acute cardiopulmonary disease.            Workstation performed: [id number]             ECG 12 Lead Documentation Only    Date/Time: 5/5/2025 5:56 AM    Performed by: Destin Lugo DO  Authorized by: Destin Lugo DO    ECG reviewed by me, the ED Provider: yes    Patient location:  ED  Rate:     ECG rate:  67  Rhythm:     Rhythm: sinus rhythm    Conduction:     Conduction: normal    T waves:     T waves: normal        ED Medication and Procedure Management   Prior to Admission Medications   Prescriptions Last Dose Informant Patient Reported? Taking?   amLODIPine (NORVASC) 5 mg tablet   No No   Sig: Take 1 tablet (5 mg total) by mouth daily   atenolol (TENORMIN) 25 mg tablet   No No   Sig: Take 1 tablet (25 mg total) by mouth daily Patient takes at 2300 at home      Facility-Administered Medications: None     Discharge Medication List as of 5/5/2025  1:27 PM        CONTINUE these medications which have NOT CHANGED    Details   amLODIPine (NORVASC) 5 mg tablet Take 1 tablet (5 mg total) by mouth daily, Starting Wed 10/16/2024, Until Fri 11/15/2024, Normal      atenolol (TENORMIN) 25 mg tablet Take 1 tablet (25 mg total) by mouth daily Patient takes at 2300 at home, Starting Wed 10/16/2024,  Normal           No discharge procedures on file.  ED SEPSIS DOCUMENTATION   Time reflects when diagnosis was documented in both MDM as applicable and the Disposition within this note       Time User Action Codes Description Comment    5/5/2025  6:21 AM Destin Lugo Add [R41.82] Altered mental status     5/5/2025  1:17 PM Lobo Summers [R51.9] Headache                  Destin Lugo DO  05/08/25 1248

## 2025-05-05 NOTE — ED CARE HANDOFF
Emergency Department Sign Out Note        Sign out and transfer of care from Dr. Lugo. See Separate Emergency Department note.     The patient, Ashlee Bermeo, was evaluated by the previous provider for Headche.    Workup Completed:  See previous labs    ED Course / Workup Pending (followup):       Reviewed past medical records: Yes, previous notes reviewed, previous imaging reviewed.     History Provided by: Family, patient     Differential considered: Stroke, electrolyte abnormality, arrhythmia     Consideration of tests: EKG, CT head and MRI of head were all negative for acute pathology.  Reviewed that this could still be something like a TIA.  Warranted following with neurology.  Patient declined admission.  Family okay with plan for outpatient follow-up.  Given resources for both follow-up with neurology and PCP.       I have reviewed the patient's visit and any testing done in the emergency department.  They have verbalized their understanding of any testing done today and have no further questions or concerns regarding their care in the emergency room.  They will follow up with their primary care physician as well as with any specialist in their discharge instructions.  Strict return precautions were discussed.      No data recorded     Stroke Assessment       Row Name 05/05/25 0548             NIH Stroke Scale    Interval Baseline      Level of Consciousness (1a.) 0      LOC Questions (1b.) 0      LOC Commands (1c.) 0      Best Gaze (2.) 0      Visual (3.) 0      Facial Palsy (4.) 0      Motor Arm, Left (5a.) 0      Motor Arm, Right (5b.) 0      Motor Leg, Left (6a.) 0      Motor Leg, Right (6b.) 0      Limb Ataxia (7.) 0      Sensory (8.) 0      Best Language (9.) 0      Dysarthria (10.) 0      Extinction and Inattention (11.) (Formerly Neglect) 0      Total 0                                        ED Course as of 05/05/25 1729   Mon May 05, 2025   0652 Presents with family for confusion from 1AM to 2AM for  slurred speech. Hx of hyponatremia and similar symptoms. Ate chips, improved. Non-focal exam. +occipital HA. Declined CT scans. Pending MRI.     Procedures  Medical Decision Making  Amount and/or Complexity of Data Reviewed  Labs: ordered.  Radiology: ordered and independent interpretation performed.            Disposition  Final diagnoses:   Altered mental status   Headache     Time reflects when diagnosis was documented in both MDM as applicable and the Disposition within this note       Time User Action Codes Description Comment    5/5/2025  6:21 AM Destin Lugo Add [R41.82] Altered mental status     5/5/2025  1:17 PM Lobo Summers Add [R51.9] Headache           ED Disposition       ED Disposition   Discharge    Condition   Stable    Date/Time   Mon May 5, 2025  1:17 PM    Comment   Ashlee Bermeo discharge to home/self care.                   Follow-up Information       Follow up With Specialties Details Why Contact Info Additional Information    [facility]   [address] [facility] [address]  [phone]       [facility]   [address]  [facility] [address]  [phone] [facility], [address], [place], [place], [address]   [phone]    [facility]   [address]  [address]  [facility] [address]  [phone] [facility], [address], [place], [place], [address]   [phone]          Discharge Medication List as of 5/5/2025  1:27 PM        CONTINUE these medications which have NOT CHANGED    Details   amLODIPine (NORVASC) 5 mg tablet Take 1 tablet (5 mg total) by mouth daily, Starting Wed 10/16/2024, Until Fri 11/15/2024, Normal      atenolol (TENORMIN) 25 mg tablet Take 1 tablet (25 mg total) by mouth daily Patient takes at 2300 at home, Starting Wed 10/16/2024, Normal           No  discharge procedures on file.       ED Provider  Electronically Signed by     Lobo Summers DO  05/05/25 9722

## 2025-05-05 NOTE — ED NOTES
Pt and pts son refusing CT due to concerns with radiation. Provider at bedside and addressed concerns, pt still refused.      Gena Diaz RN  05/05/25 2546

## 2025-05-19 ENCOUNTER — HOSPITAL ENCOUNTER (OUTPATIENT)
Dept: MAMMOGRAPHY | Facility: MEDICAL CENTER | Age: 80
Discharge: HOME | End: 2025-05-19
Attending: INTERNAL MEDICINE
Payer: MEDICARE

## 2025-05-19 VITALS — WEIGHT: 136 LBS | HEIGHT: 62 IN | BODY MASS INDEX: 25.03 KG/M2

## 2025-05-19 DIAGNOSIS — Z12.31 ENCOUNTER FOR SCREENING MAMMOGRAM FOR MALIGNANT NEOPLASM OF BREAST: ICD-10-CM

## 2025-05-19 PROCEDURE — 77063 BREAST TOMOSYNTHESIS BI: CPT

## 2025-05-19 PROCEDURE — 77067 SCR MAMMO BI INCL CAD: CPT
